# Patient Record
Sex: FEMALE | Race: WHITE | NOT HISPANIC OR LATINO | Employment: OTHER | URBAN - METROPOLITAN AREA
[De-identification: names, ages, dates, MRNs, and addresses within clinical notes are randomized per-mention and may not be internally consistent; named-entity substitution may affect disease eponyms.]

---

## 2017-02-23 DIAGNOSIS — Z30.41 ENCOUNTER FOR SURVEILLANCE OF CONTRACEPTIVE PILLS: ICD-10-CM

## 2017-02-23 NOTE — TELEPHONE ENCOUNTER
Norethin-Eth Estradiol-Fe 0.8-25 MG-MCG CHEW      Last Written Prescription Date: 12/16/16  Last Fill Quantity: 84,  # refills: 0   Last Office Visit with G, P or [facility] prescribing provider: 10/21/16        [facility]

## 2017-02-27 RX ORDER — NORETHINDRONE AND ETHINYL ESTRADIOL AND FERROUS FUMARATE 0.8-25(24)
KIT ORAL
Qty: 84 TABLET | Refills: 1 | Status: SHIPPED | OUTPATIENT
Start: 2017-02-27 | End: 2017-07-24

## 2017-02-27 NOTE — TELEPHONE ENCOUNTER
Prescription(s) approved per [facility] Refill Protocol.    Ryland Brown RN

## 2017-03-07 DIAGNOSIS — Z30.41 ENCOUNTER FOR SURVEILLANCE OF CONTRACEPTIVE PILLS: ICD-10-CM

## 2017-03-08 RX ORDER — NORETHINDRONE AND ETHINYL ESTRADIOL AND FERROUS FUMARATE 0.8-25(24)
KIT ORAL
Qty: 84 TABLET | Refills: 0 | OUTPATIENT
Start: 2017-03-08

## 2017-03-08 NOTE — TELEPHONE ENCOUNTER
Norethin-Eth Estradiol-Fe 0.8-25 MG-MCG CHEW 84 tablet 1 2/27/2017  No   Sig: TAKE 1 TABLET BY MOUTH DAILY   Class: E-Prescribe   Order: [id number]   E-Prescribing Status: Receipt confirmed by pharmacy (2/27/2017 12:55 PM CST)     Request denied.  Too soon to fill.    Maeve Qureshi RN

## 2017-07-24 DIAGNOSIS — Z30.41 ENCOUNTER FOR SURVEILLANCE OF CONTRACEPTIVE PILLS: ICD-10-CM

## 2017-07-24 NOTE — TELEPHONE ENCOUNTER
Norethin-Eth Estradiol-Fe 0.8-25 MG-MCG CHEW      Last Written Prescription Date: 02/27/17  Last Fill Quantity: 84,  # refills: 1   Last Office Visit with [facility], JOSE or [facility] prescribing provider: 10/21/16        [facility]

## 2017-07-26 PROBLEM — Z30.41 ENCOUNTER FOR SURVEILLANCE OF CONTRACEPTIVE PILLS: Status: ACTIVE | Noted: 2017-07-26

## 2017-07-26 RX ORDER — NORETHINDRONE AND ETHINYL ESTRADIOL AND FERROUS FUMARATE 0.8-25(24)
KIT ORAL
Qty: 84 TABLET | Refills: 1 | Status: SHIPPED | OUTPATIENT
Start: 2017-07-26 | End: 2017-12-18

## 2017-07-26 NOTE — TELEPHONE ENCOUNTER
Routing refill request to provider for review/approval because:  Drug not on the [facility] refill protocol   Aurelia Castro RN

## 2017-12-18 DIAGNOSIS — Z30.41 ENCOUNTER FOR SURVEILLANCE OF CONTRACEPTIVE PILLS: ICD-10-CM

## 2017-12-18 NOTE — TELEPHONE ENCOUNTER
Requested Prescriptions   Pending Prescriptions Disp Refills     KAITLIB FE 0.8-25 MG-MCG CHEW [Pharmacy Med Name: KAITLIB FE CHEWABLE TABLET]  Last Written Prescription Date:  07/26/17  Last Fill Quantity: 84,  # refills: 1   Last Office Visit with [facility], [facility] or [facility] prescribing provider:  10/21/16   Future Office Visit:    84 tablet 1     Sig: TAKE 1 TABLET BY MOUTH DAILY    Contraceptives Protocol Failed    12/18/2017  1:14 AM       Failed - Recent or future visit with authorizing provider's specialty    Patient had office visit in the last year or has a visit in the next 30 days with authorizing provider.  See chart review.              Passed - Patient is not a current smoker if age is 35 or older       Passed - No active pregnancy on record       Passed - No positive pregnancy test in past 12 months

## 2017-12-21 ENCOUNTER — OFFICE VISIT (OUTPATIENT)
Dept: FAMILY MEDICINE | Facility: CLINIC | Age: 32
End: 2017-12-21
Payer: COMMERCIAL

## 2017-12-21 VITALS
OXYGEN SATURATION: 99 % | HEART RATE: 89 BPM | SYSTOLIC BLOOD PRESSURE: 132 MMHG | HEIGHT: 61 IN | WEIGHT: 135 LBS | DIASTOLIC BLOOD PRESSURE: 82 MMHG | BODY MASS INDEX: 25.49 KG/M2 | TEMPERATURE: 98.1 F

## 2017-12-21 DIAGNOSIS — Z23 NEED FOR PROPHYLACTIC VACCINATION AND INOCULATION AGAINST INFLUENZA: ICD-10-CM

## 2017-12-21 DIAGNOSIS — A08.4 VIRAL GASTROENTERITIS: Primary | ICD-10-CM

## 2017-12-21 DIAGNOSIS — Z23 NEED FOR INFLUENZA VACCINATION: ICD-10-CM

## 2017-12-21 PROCEDURE — 99213 OFFICE O/P EST LOW 20 MIN: CPT | Mod: 25 | Performed by: NURSE PRACTITIONER

## 2017-12-21 PROCEDURE — 90471 IMMUNIZATION ADMIN: CPT | Performed by: NURSE PRACTITIONER

## 2017-12-21 PROCEDURE — 90686 IIV4 VACC NO PRSV 0.5 ML IM: CPT | Performed by: NURSE PRACTITIONER

## 2017-12-21 RX ORDER — NORETHINDRONE AND ETHINYL ESTRADIOL 0.8-25(24)
KIT ORAL
Qty: 84 TABLET | Refills: 3 | Status: SHIPPED | OUTPATIENT
Start: 2017-12-21 | End: 2018-11-27

## 2017-12-21 NOTE — PROGRESS NOTES
SUBJECTIVE:   Gudelia Albarran is a 32 year old female who presents to clinic today for the following health issues:        Diarrhea      Duration: couple of month on and off    Description:       Consistency of stool: loose       Blood in stool: no        Number of loose stools past 24 hours: 3-4 day before yesterday    Intensity:  mild, moderate    Accompanying signs and symptoms:       Fever: YES- ?       Nausea/vomitting: YES, vomited about 4 times over a couple of days but no vomiting for the last 5 days.       Abdominal pain: YES       Weight loss: YES    History (recent antibiotics or travel/ill contacts/med changes/testing done): No [place] for a week in early December    Precipitating or alleviating factors: eating mad eit worse    Therapies tried and outcome: Imodium AD and PeptoBismol    Patient reports she is allergic to artificial sweeteners but had some over her trip and got diarrhea.  Upon return, she had a cold and had persisitent diarrhea and nausea and vomiting about a week ago which has since resolved.  She is feeling much improved today, thought about cancelling her appt but wanted to be sure she didn't need an antibiotic before the Holiday.  Stools are nor brown, loose but not runny, no BRBPR, no history of hemorrhoids.  She does not think she is pregnant, LAST MENSTUAL PERIOD 12/10/17 and she is compliant with her OCP's. No other ill family members.    Problem list and histories reviewed & adjusted, as indicated.  Additional history: as documented    Patient Active Problem List   Diagnosis     Encounter for surveillance of contraceptive pills     No past surgical history on file.    Social History   Substance Use Topics     Smoking status: Never Smoker     Smokeless tobacco: Not on file     Alcohol use Yes     Family History   Problem Relation Age of Onset     Breast Cancer Maternal Grandmother      Hypertension Paternal Grandfather      Breast Cancer Maternal Aunt      DIABETES No family hx  "of      Coronary Artery Disease No family hx of      Hyperlipidemia No family hx of      CEREBROVASCULAR DISEASE Paternal Grandfather      late 80's     Colon Cancer No family hx of      Depression No family hx of      Anxiety Disorder No family hx of      Asthma Sister      Thyroid Disease No family hx of      Genetic Disorder No family hx of      Seizure Disorder Sister      trauma induced         BP Readings from Last 3 Encounters:   12/21/17 132/82   10/21/16 132/88   01/06/16 128/78    Wt Readings from Last 3 Encounters:   12/21/17 135 lb (61.2 kg)   10/21/16 140 lb 6.4 oz (63.7 kg)   01/06/16 140 lb 3.2 oz (63.6 kg)                  Labs reviewed in EPIC        Reviewed and updated as needed this visit by clinical staffTobacco  Allergies       Reviewed and updated as needed this visit by Provider         ROS:  Constitutional, HEENT, cardiovascular, pulmonary, gi and gu systems are negative, except as otherwise noted.      OBJECTIVE:   /82  Pulse 89  Temp 98.1  F (36.7  C) (Tympanic)  Ht 5' 0.83\" (1.545 m)  Wt 135 lb (61.2 kg)  SpO2 99%  Breastfeeding? No  BMI 25.65 kg/m2  Body mass index is 25.65 kg/(m^2).  GENERAL: healthy, alert and no distress  EYES: Eyes grossly normal to inspection, PERRL and conjunctivae and sclerae normal  HENT: ear canals and TM's normal, nose and mouth without ulcers or lesions  NECK: no adenopathy, no asymmetry, masses, or scars and thyroid normal to palpation  RESP: lungs clear to auscultation - no rales, rhonchi or wheezes  CV: regular rate and rhythm, normal S1 S2, no S3 or S4, no murmur, click or rub, no peripheral edema and peripheral pulses strong  ABDOMEN: soft, nontender, no hepatosplenomegaly, no masses and bowel sounds normal  MS: no gross musculoskeletal defects noted, no edema  SKIN: no suspicious lesions or rashes  NEURO: Normal strength and tone, mentation intact and speech normal  PSYCH: mentation appears normal, affect normal/bright  LYMPH: normal " "ant/post cervical, supraclavicular nodes    Diagnostic Test Results:  none     ASSESSMENT/PLAN:       BP Screening:   Last 3 BP Readings:    BP Readings from Last 3 Encounters:   12/21/17 132/82   10/21/16 132/88   01/06/16 128/78       The following was recommended to the patient:  Re-screen BP within a year and recommended lifestyle modifications  BMI:   Estimated body mass index is 25.65 kg/(m^2) as calculated from the following:    Height as of this encounter: 5' 0.83\" (1.545 m).    Weight as of this encounter: 135 lb (61.2 kg).   Weight management plan: Discussed healthy diet and exercise guidelines and patient will follow up in 12 months in clinic to re-evaluate.        1. Viral gastroenteritis  SUPPORTIVE CARE FOR VOMITING/DIARRHEA REVIEWED. RECHECK IF BLOODY DIARRHEA, PROLONGED SYMPTOMS OR SX OF DEHYDRATION WHICH WERE DISCUSSED. SMALL FREQUENT AMOUNTS OF REHYDRATION FLUIDS SUCH AS PEDIALYTE TO BE GRADUALLY ADVANCED AS TOLERATED FOR VOMITING. FOR DIARRHEA ENCOURAGE REHYDRATION FLUIDS AS WELL AS DIET OF SOLIDS AS TOLERATED. Patient is doing much better today and has already begun advancing her diet.  2. Need for influenza vaccination      3. Need for prophylactic vaccination and inoculation against influenza    - FLU VAC, SPLIT VIRUS IM > 3 YO (QUADRIVALENT) [27191]  - Vaccine Administration, Initial [51456]    See Patient Instructions    TRINY Jiménez [facility]  "

## 2017-12-21 NOTE — NURSING NOTE
"Chief Complaint   Patient presents with     Gastrointestinal Problem       Initial /90 (BP Location: Left arm, Patient Position: Sitting, Cuff Size: Adult Regular)  Pulse 89  Temp 98.1  F (36.7  C) (Tympanic)  Ht 5' 0.83\" (1.545 m)  Wt 135 lb (61.2 kg)  SpO2 99%  Breastfeeding? No  BMI 25.65 kg/m2 Estimated body mass index is 25.65 kg/(m^2) as calculated from the following:    Height as of this encounter: 5' 0.83\" (1.545 m).    Weight as of this encounter: 135 lb (61.2 kg).  Medication Reconciliation: complete .  Nikki PATE        "

## 2017-12-21 NOTE — TELEPHONE ENCOUNTER
Routing refill request to provider for review/approval because:  Failed protocol    Aurelia Castro RN

## 2017-12-21 NOTE — PROGRESS NOTES

## 2017-12-21 NOTE — MR AVS SNAPSHOT
After Visit Summary   12/21/2017    Gudelia Albarrna    MRN: [id number]           Patient Information     Date Of Birth          1985        Visit Information        Provider Department      12/21/2017 1:40 PM Ashlee Oakley APRN CNP [facility]        Today's Diagnoses     Viral gastroenteritis    -  1      Care Instructions    At [facility], we strive to deliver an exceptional experience to you, every time we see you.  If you receive a survey in the mail, please send us back your thoughts. We really do value your feedback.    Based on your medical history, these are the current health maintenance/preventive care services that you are due for (some may have been done at this visit.)  Health Maintenance Due   Topic Date Due     INFLUENZA VACCINE (SYSTEM ASSIGNED)  09/01/2017         Suggested websites for health information:  Www.Preo : Up to date and easily searchable information on multiple topics.  Www.[employer].gov : medication info, interactive tutorials, watch real surgeries online  Www.familydoctor.org : good info from the Academy of Family Physicians  Www.cdc.gov : public health info, travel advisories, epidemics (H1N1)  Www.aap.org : children's health info, normal development, vaccinations  Www.health.state.[place].us : [place] dept of health, public health issues in [place], N1N1    Your care team:                            Family Medicine Internal Medicine   MD Hubert Medley MD Shantel Branch-Fleming, MD Katya Georgiev PA-C Nam Ho, MD Pediatrics   VLADISLAV Berumen, MD Ree Maldonado CNP, MD Deborah Mielke, MD Kim Thein, APRN CNP      Clinic hours: Monday - Thursday 7 am-7 pm; Fridays 7 am-5 pm.   Urgent care: Monday - Friday 11 am-9 pm; Saturday and Sunday 9 am-5 pm.  Pharmacy : Monday -Thursday 8 am-8 pm; Friday 8 am-6 pm; Saturday and Sunday 9 am-5 pm.      Clinic: [phone]   Pharmacy: [phone]      Viral Gastroenteritis (Adult)    Gastroenteritis is commonly called the stomach flu. It is most often caused by a virus that affects the stomach and intestinal tract and usually lasts from 2 to 7 days. Common viruses causing gastroenteritis include norovirus, rotavirus, and hepatitis A. Non-viral causes of gastroenteritis include bacteria, parasites, and toxins.  The danger from repeated vomiting or diarrhea is dehydration. This is the loss of too much fluid from the body. When this occurs, body fluids must be replaced. Antibiotics do not help with this illness because it is usually viral.Simple home treatment will be helpful.  Symptoms of viral gastroenteritis may include:    Watery, loose stools    Stomach pain or abdominal cramps    Fever and chills    Nausea and vomiting    Loss of bowel control    Headache  Home care  Gastroenteritis is transmitted by contact with the stool or vomit of an infected person. This can occur from person to person or from contact with a contaminated surface.  Follow these guidelines when caring for yourself at home:    If symptoms are severe, rest at home for the next 24 hours or until you are feeling better.    Wash your hands with soap and water or use alcohol-based  to prevent the spread of infection. Wash your hands after touching anyone who is sick.    Wash your hands or use alcohol-based  after using the toilet and before meals. Clean the toilet after each use.  Remember these tips when preparing food:    People with diarrhea should not prepare or serve food to others. When preparing foods, wash your hands before and after.    Wash your hands after using cutting boards, countertops, knives, or utensils that have been in contact with raw food.    Keep uncooked meats away from cooked and ready-to-eat foods.  Medicine  You may use acetaminophen or NSAID medicines like ibuprofen or naproxen to control  fever unless another medicine was given. If you have chronic liver or kidney disease, talk with your healthcare provider before using these medicines. Also talk with your provider if you've had a stomach ulcer or gastrointestinal bleeding. Don't give aspirin to anyone under 18 years of age who is ill with a fever. It may cause severe liver damage. Don't use NSAIDS is you are already taking one for another condition (like arthritis) or are on aspirin (such as for heart disease or after a stroke).  If medicine for vomiting or diarrhea are prescribed, take these only as directed. Do not take over-the-counter medicines for vomiting or diarrhea unless instructed by your healthcare provider.  Diet  Follow these guidelines for food:    Water and liquids are important so you don't get dehydrated. Drink a small amount at a time or suck on ice chips if you are vomiting.    If you eat, avoid fatty, greasy, spicy, or fried foods.    Don't eat dairy if you have diarrhea. This can make diarrhea worse.    Avoid tobacco, alcohol, and caffeine which may worsen symptoms.  During the first 24 hours (the first full day), follow the diet below:    Beverages. Sports drinks, soft drinks without caffeine, ginger ale, mineral water (plain or flavored), decaffeinated tea and coffee. If you are very dehydrated, sports drinks aren't a good choice. They have too much sugar and not enough electrolytes. In this case, commercially available products called oral rehydration solutions, are best.    Soups. Eat clear broth, consommé, and bouillon.    Desserts. Eat gelatin, popsicles, and fruit juice bars.  During the next 24 hours (the second day), you may add the following to the above:    Hot cereal, plain toast, bread, rolls, and crackers    Plain noodles, rice, mashed potatoes, chicken noodle or rice soup    Unsweetened canned fruit (avoid pineapple), bananas    Limit fat intake to less than 15 grams per day. Do this by avoiding margarine, butter,  oils, mayonnaise, sauces, gravies, fried foods, peanut butter, meat, poultry, and fish.    Limit fiber and avoid raw or cooked vegetables, fresh fruits (except bananas), and bran cereals.    Limit caffeine and chocolate. Don't use spices or seasonings other than salt.    Limit dairy products.    Avoid alcohol.  During the next 24 hours:    Gradually resume a normal diet as you feel better and your symptoms improve.    If at any time it starts getting worse again, go back to clear liquids until you feel better.  Follow-up care  Follow up with your healthcare provider, or as advised. Call your provider if you don't get better within 24 hours or if diarrhea lasts more than a week. Also follow up if you are unable to keep down liquids and get dehydrated. If a stool (diarrhea) sample was taken, call as directed for the results.  Call 911  Call 911 if any of these occur:    Trouble breathing    Chest pain    Confused    Severe drowsiness or trouble awakening    Fainting or loss of consciousness    Rapid heart rate    Seizure    Stiff neck  When to seek medical advice  Call your healthcare provider right away if any of these occur:    Abdominal pain that gets worse    Continued vomiting (unable to keep liquids down)    Frequent diarrhea (more than 5 times a day)    Blood in vomit or stool (black or red color)    Dark urine, reduced urine output, or extreme thirst    Weakness or dizziness    Drowsiness    Fever of 100.4 F (38 C) oral or higher that does not get better with fever medicine    New rash  Date Last Reviewed: 1/3/2016    7195-3678 [employer]. [address] [place] [address], [place] [address]. All rights reserved. This information is not intended as a substitute for professional medical care. Always follow your healthcare professional's instructions.                Follow-ups after your visit        Who to contact     If you have questions or need follow up information about today's clinic visit or your  "schedule please contact [facility] directly at [phone].  Normal or non-critical lab and imaging results will be communicated to you by MyChart, letter or phone within 4 business days after the clinic has received the results. If you do not hear from us within 7 days, please contact the clinic through Qubell[facility] or phone. If you have a critical or abnormal lab result, we will notify you by phone as soon as possible.  Submit refill requests through Rain or call your pharmacy and they will forward the refill request to us. Please allow 3 business days for your refill to be completed.          Additional Information About Your Visit        [facility]ubell[facility] Information     Rain gives you secure access to your electronic health record. If you see a primary care provider, you can also send messages to your care team and make appointments. If you have questions, please call your primary care clinic.  If you do not have a primary care provider, please call [phone] and they will assist you.        Care EveryWhere ID     This is your Care EveryWhere ID. This could be used by other organizations to access your [place] medical records  [id number]        Your Vitals Were     Pulse Temperature Height Pulse Oximetry Breastfeeding? BMI (Body Mass Index)    89 98.1  F (36.7  C) (Tympanic) 5' 0.83\" (1.545 m) 99% No 25.65 kg/m2       Blood Pressure from Last 3 Encounters:   12/21/17 135/90   10/21/16 132/88   01/06/16 128/78    Weight from Last 3 Encounters:   12/21/17 135 lb (61.2 kg)   10/21/16 140 lb 6.4 oz (63.7 kg)   01/06/16 140 lb 3.2 oz (63.6 kg)              Today, you had the following     No orders found for display       Primary Care Provider Office Phone # Fax #    TRINY Neumann [phone]       [facility] [address]  [facility] [address]        Equal Access to Services     VANDANA DICKENS AH: Hadii ham ku hadasho Soomaali, waaxda luqadaha, qaybta kaalmada " reyna sototariq de leónaadaniel ahFredrick Preciado [facility] [phone].    ATENCIÓN: Si crissyla tierra, tiene a purdy disposición servicios gratuitos de asistencia lingüística. Naz al [phone].    We comply with applicable federal civil rights laws and [place] laws. We do not discriminate on the basis of race, color, national origin, age, disability, sex, sexual orientation, or gender identity.            Thank you!     Thank you for choosing [facility]  for your care. Our goal is always to provide you with excellent care. Hearing back from our patients is one way we can continue to improve our services. Please take a few minutes to complete the written survey that you may receive in the mail after your visit with us. Thank you!             Your Updated Medication List - Protect others around you: Learn how to safely use, store and throw away your medicines at www.disposemymeds.org.          This list is accurate as of: 12/21/17  2:31 PM.  Always use your most recent med list.                   Brand Name Dispense Instructions for use Diagnosis    atovaquone-proguanil 250-100 MG per tablet    MALARONE    35 tablet    Take 1 tablet by mouth daily Start 2 days before travel and continue 7 days after return.    Travel advice encounter       * Norethin-Eth Estradiol-Fe 0.8-25 MG-MCG Chew     84 tablet    Take 1 tablet by mouth daily    Encounter for surveillance of contraceptive pills       * Norethin-Eth Estradiol-Fe 0.8-25 MG-MCG Chew     84 tablet    TAKE 1 TABLET BY MOUTH DAILY    Encounter for surveillance of contraceptive pills       * Notice:  This list has 2 medication(s) that are the same as other medications prescribed for you. Read the directions carefully, and ask your doctor or other care provider to review them with you.

## 2017-12-21 NOTE — PATIENT INSTRUCTIONS
At [facility], we strive to deliver an exceptional experience to you, every time we see you.  If you receive a survey in the mail, please send us back your thoughts. We really do value your feedback.    Based on your medical history, these are the current health maintenance/preventive care services that you are due for (some may have been done at this visit.)  Health Maintenance Due   Topic Date Due     INFLUENZA VACCINE (SYSTEM ASSIGNED)  09/01/2017         Suggested websites for health information:  Www.ASYM III.org : Up to date and easily searchable information on multiple topics.  Www.medlineplus.gov : medication info, interactive tutorials, watch real surgeries online  Www.familydoctor.org : good info from the Academy of Family Physicians  Www.cdc.gov : public health info, travel advisories, epidemics (H1N1)  Www.aap.org : children's health info, normal development, vaccinations  Www.health.[facility].[place].us : [place] dept of health, public health issues in [place], N1N1    Your care team:                            Family Medicine Internal Medicine   MD Hubert Medley MD Shantel Branch-Fleming, MD Katya Georgiev PA-C Nam Ho, MD Pediatrics   [place] PASYED Rubin, CNP Comfort Bolton APRN CNP   MD Ree Padgett MD Deborah Mielke, MD Kim Thein, APRN CNP      Clinic hours: Monday - Thursday 7 am-7 pm; Fridays 7 am-5 pm.   Urgent care: Monday - Friday 11 am-9 pm; Saturday and Sunday 9 am-5 pm.  Pharmacy : Monday -Thursday 8 am-8 pm; Friday 8 am-6 pm; Saturday and Sunday 9 am-5 pm.     Clinic: [phone]   Pharmacy: [phone]      Viral Gastroenteritis (Adult)    Gastroenteritis is commonly called the stomach flu. It is most often caused by a virus that affects the stomach and intestinal tract and usually lasts from 2 to 7 days. Common viruses causing gastroenteritis include norovirus, rotavirus, and hepatitis A. Non-viral causes of  gastroenteritis include bacteria, parasites, and toxins.  The danger from repeated vomiting or diarrhea is dehydration. This is the loss of too much fluid from the body. When this occurs, body fluids must be replaced. Antibiotics do not help with this illness because it is usually viral.Simple home treatment will be helpful.  Symptoms of viral gastroenteritis may include:    Watery, loose stools    Stomach pain or abdominal cramps    Fever and chills    Nausea and vomiting    Loss of bowel control    Headache  Home care  Gastroenteritis is transmitted by contact with the stool or vomit of an infected person. This can occur from person to person or from contact with a contaminated surface.  Follow these guidelines when caring for yourself at home:    If symptoms are severe, rest at home for the next 24 hours or until you are feeling better.    Wash your hands with soap and water or use alcohol-based  to prevent the spread of infection. Wash your hands after touching anyone who is sick.    Wash your hands or use alcohol-based  after using the toilet and before meals. Clean the toilet after each use.  Remember these tips when preparing food:    People with diarrhea should not prepare or serve food to others. When preparing foods, wash your hands before and after.    Wash your hands after using cutting boards, countertops, knives, or utensils that have been in contact with raw food.    Keep uncooked meats away from cooked and ready-to-eat foods.  Medicine  You may use acetaminophen or NSAID medicines like ibuprofen or naproxen to control fever unless another medicine was given. If you have chronic liver or kidney disease, talk with your healthcare provider before using these medicines. Also talk with your provider if you've had a stomach ulcer or gastrointestinal bleeding. Don't give aspirin to anyone under 18 years of age who is ill with a fever. It may cause severe liver damage. Don't use NSAIDS is  you are already taking one for another condition (like arthritis) or are on aspirin (such as for heart disease or after a stroke).  If medicine for vomiting or diarrhea are prescribed, take these only as directed. Do not take over-the-counter medicines for vomiting or diarrhea unless instructed by your healthcare provider.  Diet  Follow these guidelines for food:    Water and liquids are important so you don't get dehydrated. Drink a small amount at a time or suck on ice chips if you are vomiting.    If you eat, avoid fatty, greasy, spicy, or fried foods.    Don't eat dairy if you have diarrhea. This can make diarrhea worse.    Avoid tobacco, alcohol, and caffeine which may worsen symptoms.  During the first 24 hours (the first full day), follow the diet below:    Beverages. Sports drinks, soft drinks without caffeine, ginger ale, mineral water (plain or flavored), decaffeinated tea and coffee. If you are very dehydrated, sports drinks aren't a good choice. They have too much sugar and not enough electrolytes. In this case, commercially available products called oral rehydration solutions, are best.    Soups. Eat clear broth, consommé, and bouillon.    Desserts. Eat gelatin, popsicles, and fruit juice bars.  During the next 24 hours (the second day), you may add the following to the above:    Hot cereal, plain toast, bread, rolls, and crackers    Plain noodles, rice, mashed potatoes, chicken noodle or rice soup    Unsweetened canned fruit (avoid pineapple), bananas    Limit fat intake to less than 15 grams per day. Do this by avoiding margarine, butter, oils, mayonnaise, sauces, gravies, fried foods, peanut butter, meat, poultry, and fish.    Limit fiber and avoid raw or cooked vegetables, fresh fruits (except bananas), and bran cereals.    Limit caffeine and chocolate. Don't use spices or seasonings other than salt.    Limit dairy products.    Avoid alcohol.  During the next 24 hours:    Gradually resume a normal  diet as you feel better and your symptoms improve.    If at any time it starts getting worse again, go back to clear liquids until you feel better.  Follow-up care  Follow up with your healthcare provider, or as advised. Call your provider if you don't get better within 24 hours or if diarrhea lasts more than a week. Also follow up if you are unable to keep down liquids and get dehydrated. If a stool (diarrhea) sample was taken, call as directed for the results.  Call 911  Call 911 if any of these occur:    Trouble breathing    Chest pain    Confused    Severe drowsiness or trouble awakening    Fainting or loss of consciousness    Rapid heart rate    Seizure    Stiff neck  When to seek medical advice  Call your healthcare provider right away if any of these occur:    Abdominal pain that gets worse    Continued vomiting (unable to keep liquids down)    Frequent diarrhea (more than 5 times a day)    Blood in vomit or stool (black or red color)    Dark urine, reduced urine output, or extreme thirst    Weakness or dizziness    Drowsiness    Fever of 100.4 F (38 C) oral or higher that does not get better with fever medicine    New rash  Date Last Reviewed: 1/3/2016    8736-1589 [employer]. [address] [place] [address], [place] [address]. All rights reserved. This information is not intended as a substitute for professional medical care. Always follow your healthcare professional's instructions.

## 2018-11-27 ENCOUNTER — TELEPHONE (OUTPATIENT)
Dept: FAMILY MEDICINE | Facility: CLINIC | Age: 33
End: 2018-11-27

## 2018-11-27 DIAGNOSIS — Z30.41 ENCOUNTER FOR SURVEILLANCE OF CONTRACEPTIVE PILLS: ICD-10-CM

## 2018-11-27 NOTE — TELEPHONE ENCOUNTER
"Requested Prescriptions   Pending Prescriptions Disp Refills     KAITLIB FE 0.8-25 MG-MCG CHEW [Pharmacy Med Name: KAITLIB FE CHEWABLE TABLET]  Last Written Prescription Date:  12/21/17  Last Fill Quantity: 84,  # refills: 3   Last Office Visit with G, P or [facility] prescribing provider:  12/21/17-Thein   Future Office Visit:    84 tablet 3     Sig: TAKE 1 TABLET BY MOUTH DAILY    Contraceptives Protocol Passed    11/27/2018  2:25 AM       Passed - Patient is not a current smoker if age is 35 or older       Passed - Recent (12 mo) or future (30 days) visit within the authorizing provider's specialty    Patient had office visit in the last 12 months or has a visit in the next 30 days with authorizing provider or within the authorizing provider's specialty.  See \"Patient Info\" tab in inbasket, or \"Choose Columns\" in Meds & Orders section of the refill encounter.             Passed - No active pregnancy on record       Passed - No positive pregnancy test in past 12 months          "

## 2018-11-28 RX ORDER — NORETHINDRONE AND ETHINYL ESTRADIOL 0.8-25(24)
KIT ORAL
Qty: 28 TABLET | Refills: 0 | Status: SHIPPED | OUTPATIENT
Start: 2018-11-28 | End: 2019-01-07

## 2018-11-28 NOTE — TELEPHONE ENCOUNTER
Medication is being filled for 1 time refill only due to:  Patient needs to be seen because due for yearly office visit in December 2018.     TC to please call patient and schedule yearly physical appointment. Thank you.    Barbie Peña RN, BSN

## 2018-12-25 DIAGNOSIS — Z30.41 ENCOUNTER FOR SURVEILLANCE OF CONTRACEPTIVE PILLS: ICD-10-CM

## 2018-12-25 NOTE — TELEPHONE ENCOUNTER
"Requested Prescriptions   Pending Prescriptions Disp Refills     KAITLIB FE 0.8-25 MG-MCG CHEW [Pharmacy Med Name: KAITLIB FE CHEWABLE TABLET] 28 tablet 0     Sig: TAKE 1 TABLET BY MOUTH EVERY DAY    Contraceptives Protocol Failed - 12/25/2018  1:27 AM       Failed - Recent (12 mo) or future (30 days) visit within the authorizing provider's specialty    Patient had office visit in the last 12 months or has a visit in the next 30 days with authorizing provider or within the authorizing provider's specialty.  See \"Patient Info\" tab in inbasket, or \"Choose Columns\" in Meds & Orders section of the refill encounter.      Last Written Prescription Date:  11/28/18  Last Fill Quantity: 28,  # refills: 0   Last office visit: 12/21/2017 with prescribing provider:     Future Office Visit:               Passed - Patient is not a current smoker if age is 35 or older       Passed - No active pregnancy on record       Passed - No positive pregnancy test in past 12 months          "

## 2018-12-27 RX ORDER — NORETHINDRONE AND ETHINYL ESTRADIOL 0.8-25(24)
KIT ORAL
Qty: 28 TABLET | Refills: 0 | OUTPATIENT
Start: 2018-12-27

## 2018-12-27 NOTE — TELEPHONE ENCOUNTER
Routing refill request to provider for review/approval because:  Patient needs to be seen because it has been more than 1 year since last office visit.      Barbie Peña RN, BSN

## 2018-12-31 NOTE — TELEPHONE ENCOUNTER
Called and spoke to patient and she states that she knows that she needs an appointment and will call back to schedule.  Trinity Simental MA/  For Teams Timmy

## 2019-01-07 ENCOUNTER — OFFICE VISIT (OUTPATIENT)
Dept: FAMILY MEDICINE | Facility: CLINIC | Age: 34
End: 2019-01-07
Payer: COMMERCIAL

## 2019-01-07 VITALS
WEIGHT: 145.8 LBS | DIASTOLIC BLOOD PRESSURE: 72 MMHG | TEMPERATURE: 98.4 F | HEIGHT: 61 IN | HEART RATE: 87 BPM | OXYGEN SATURATION: 98 % | BODY MASS INDEX: 27.53 KG/M2 | SYSTOLIC BLOOD PRESSURE: 132 MMHG

## 2019-01-07 DIAGNOSIS — Z12.4 SCREENING FOR MALIGNANT NEOPLASM OF CERVIX: ICD-10-CM

## 2019-01-07 DIAGNOSIS — Z28.21 INFLUENZA VACCINATION DECLINED BY PATIENT: ICD-10-CM

## 2019-01-07 DIAGNOSIS — Z00.00 ROUTINE PHYSICAL EXAMINATION: Primary | ICD-10-CM

## 2019-01-07 DIAGNOSIS — Z30.41 ENCOUNTER FOR SURVEILLANCE OF CONTRACEPTIVE PILLS: ICD-10-CM

## 2019-01-07 DIAGNOSIS — Z11.4 SCREENING FOR HIV (HUMAN IMMUNODEFICIENCY VIRUS): ICD-10-CM

## 2019-01-07 LAB — GLUCOSE SERPL-MCNC: 107 MG/DL (ref 70–99)

## 2019-01-07 PROCEDURE — 82947 ASSAY GLUCOSE BLOOD QUANT: CPT | Performed by: NURSE PRACTITIONER

## 2019-01-07 PROCEDURE — 99395 PREV VISIT EST AGE 18-39: CPT | Performed by: NURSE PRACTITIONER

## 2019-01-07 PROCEDURE — 87624 HPV HI-RISK TYP POOLED RSLT: CPT | Performed by: NURSE PRACTITIONER

## 2019-01-07 PROCEDURE — G0145 SCR C/V CYTO,THINLAYER,RESCR: HCPCS | Performed by: NURSE PRACTITIONER

## 2019-01-07 PROCEDURE — 87389 HIV-1 AG W/HIV-1&-2 AB AG IA: CPT | Performed by: NURSE PRACTITIONER

## 2019-01-07 PROCEDURE — 36415 COLL VENOUS BLD VENIPUNCTURE: CPT | Performed by: NURSE PRACTITIONER

## 2019-01-07 RX ORDER — NORETHINDRONE AND ETHINYL ESTRADIOL AND FERROUS FUMARATE 0.8-25(24)
1 KIT ORAL DAILY
Qty: 84 TABLET | Refills: 3 | Status: SHIPPED | OUTPATIENT
Start: 2019-01-07 | End: 2019-12-11

## 2019-01-07 RX ORDER — NORETHINDRONE AND ETHINYL ESTRADIOL AND FERROUS FUMARATE 0.8-25(24)
1 KIT ORAL DAILY
Qty: 84 TABLET | Refills: 0 | Status: CANCELLED | OUTPATIENT
Start: 2019-01-07

## 2019-01-07 ASSESSMENT — MIFFLIN-ST. JEOR: SCORE: 1320.33

## 2019-01-07 NOTE — PATIENT INSTRUCTIONS
At [facility], we strive to deliver an exceptional experience to you, every time we see you.  If you receive a survey in the mail, please send us back your thoughts. We really do value your feedback.    Your care team:                            Family Medicine Internal Medicine   MD Hubert Medley MD Shantel Branch-Fleming, MD Katya Georgiev PA-C Megan Hill, APRN CNP    Joseph Jensen MD Pediatrics   [place], VLADISLAV Rubin, MD Comfort Mccollum APRN CNP   MD Ree Padgett MD Deborah Mielke, MD Jeanine Oakley, APRN [facility]      Clinic hours: Monday - Thursday 7 am-7 pm; Fridays 7 am-5 pm.   Urgent care: Monday - Friday 11 am-9 pm; Saturday and Sunday 9 am-5 pm.  Pharmacy : Monday -Thursday 8 am-8 pm; Friday 8 am-6 pm; Saturday and Sunday 9 am-5 pm.     Clinic: [phone]   Pharmacy: [phone]        Preventive Health Recommendations  Female Ages 26 - 39  Yearly exam:   See your health care provider every year in order to    Review health changes.     Discuss preventive care.      Review your medicines if you your doctor has prescribed any.    Until age 30: Get a Pap test every three years (more often if you have had an abnormal result).    After age 30: Talk to your doctor about whether you should have a Pap test every 3 years or have a Pap test with HPV screening every 5 years.   You do not need a Pap test if your uterus was removed (hysterectomy) and you have not had cancer.  You should be tested each year for STDs (sexually transmitted diseases), if you're at risk.   Talk to your provider about how often to have your cholesterol checked.  If you are at risk for diabetes, you should have a diabetes test (fasting glucose).  Shots: Get a flu shot each year. Get a tetanus shot every 10 years.   Nutrition:     Eat at least 5 servings of fruits and vegetables each day.    Eat whole-grain bread, whole-wheat pasta and brown rice  instead of white grains and rice.    Get adequate Calcium and Vitamin D.     Lifestyle    Exercise at least 150 minutes a week (30 minutes a day, 5 days of the week). This will help you control your weight and prevent disease.    Limit alcohol to one drink per day.    No smoking.     Wear sunscreen to prevent skin cancer.    See your dentist every six months for an exam and cleaning.

## 2019-01-07 NOTE — PROGRESS NOTES
SUBJECTIVE:   CC: Gudelia Albarran is an 33 year old woman who presents for preventive health visit.     Healthy Habits:    Do you get at least three servings of calcium containing foods daily (dairy, green leafy vegetables, etc.)? yes    Amount of exercise or daily activities, outside of work: 5 day(s) per week    Problems taking medications regularly No    Medication side effects: No    Have you had an eye exam in the past two years? no    Do you see a dentist twice per year? no    Do you have sleep apnea, excessive snoring or daytime drowsiness?no      Needs refill on COCP- tolerating well, no issues with compliance, no STD history, no abnormal pap history.    Today's PHQ-2 Score:   PHQ-2 ( 1999 Pf[employer]) 12/21/2017 1/6/2016   Q1: Little interest or pleasure in doing things 0 0   Q2: Feeling down, depressed or hopeless 0 0   PHQ-2 Score 0 0       Abuse: Current or Past(Physical, Sexual or Emotional)- No  Do you feel safe in your environment? Yes    Social History     Tobacco Use     Smoking status: Never Smoker     Smokeless tobacco: Never Used   Substance Use Topics     Alcohol use: Yes     If you drink alcohol do you typically have >3 drinks per day or >7 drinks per week? No                     Reviewed orders with patient.  Reviewed health maintenance and updated orders accordingly - Yes  Labs reviewed in EPIC  BP Readings from Last 3 Encounters:   01/07/19 132/72   12/21/17 132/82   10/21/16 132/88    Wt Readings from Last 3 Encounters:   01/07/19 66.1 kg (145 lb 12.8 oz)   12/21/17 61.2 kg (135 lb)   10/21/16 63.7 kg (140 lb 6.4 oz)                  Patient Active Problem List   Diagnosis     Encounter for surveillance of contraceptive pills     History reviewed. No pertinent surgical history.    Social History     Tobacco Use     Smoking status: Never Smoker     Smokeless tobacco: Never Used   Substance Use Topics     Alcohol use: Yes     Family History   Problem Relation Age of Onset     Breast Cancer  "Maternal Grandmother      Hypertension Paternal Grandfather      Cerebrovascular Disease Paternal Grandfather         late 80's     Breast Cancer Maternal Aunt      Asthma Sister      Seizure Disorder Sister         trauma induced     Diabetes No family hx of      Coronary Artery Disease No family hx of      Hyperlipidemia No family hx of      Colon Cancer No family hx of      Depression No family hx of      Anxiety Disorder No family hx of      Thyroid Disease No family hx of      Genetic Disorder No family hx of            Mammogram not appropriate for this patient based on age.    Pertinent mammograms are reviewed under the imaging tab.  History of abnormal Pap smear: NO - age 30-65 PAP every 5 years with negative HPV co-testing recommended  PAP / HPV 1/6/2016   PAP NIL     Reviewed and updated as needed this visit by clinical staff  Tobacco  Allergies  Meds  Med Hx  Surg Hx  Fam Hx  Soc Hx        Reviewed and updated as needed this visit by Provider  Tobacco  Med Hx  Surg Hx  Fam Hx        History reviewed. No pertinent past medical history.   History reviewed. No pertinent surgical history.    ROS:  CONSTITUTIONAL: NEGATIVE for fever, chills, change in weight  INTEGUMENTARU/SKIN: NEGATIVE for worrisome rashes, moles or lesions  EYES: NEGATIVE for vision changes or irritation  ENT: NEGATIVE for ear, mouth and throat problems  RESP: NEGATIVE for significant cough or SOB  BREAST: NEGATIVE for masses, tenderness or discharge  CV: NEGATIVE for chest pain, palpitations or peripheral edema  GI: NEGATIVE for nausea, abdominal pain, heartburn, or change in bowel habits  : NEGATIVE for unusual urinary or vaginal symptoms. Periods are regular.  MUSCULOSKELETAL: NEGATIVE for significant arthralgias or myalgia  NEURO: NEGATIVE for weakness, dizziness or paresthesias  PSYCHIATRIC: NEGATIVE for changes in mood or affect    OBJECTIVE:   /72   Pulse 87   Temp 98.4  F (36.9  C) (Oral)   Ht 1.56 m (5' 1.42\")  "  Wt 66.1 kg (145 lb 12.8 oz)   SpO2 98%   BMI 27.18 kg/m    EXAM:  GENERAL: healthy, alert and no distress  EYES: Eyes grossly normal to inspection, PERRL and conjunctivae and sclerae normal  HENT: ear canals and TM's normal, nose and mouth without ulcers or lesions  NECK: no adenopathy, no asymmetry, masses, or scars and thyroid normal to palpation  RESP: lungs clear to auscultation - no rales, rhonchi or wheezes  BREAST: normal without masses, tenderness or nipple discharge and no palpable axillary masses or adenopathy  CV: regular rate and rhythm, normal S1 S2, no S3 or S4, no murmur, click or rub, no peripheral edema and peripheral pulses strong  ABDOMEN: soft, nontender, no hepatosplenomegaly, no masses and bowel sounds normal   (female): normal female external genitalia, normal urethral meatus, vaginal mucosa pink, moist, well rugated, and normal cervix/adnexa/uterus without masses or discharge  MS: no gross musculoskeletal defects noted, no edema  SKIN: no suspicious lesions or rashes  NEURO: Normal strength and tone, mentation intact and speech normal  PSYCH: mentation appears normal, affect normal/bright  LYMPH: no cervical, supraclavicular, axillary, or inguinal adenopathy    Diagnostic Test Results:  No results found for this or any previous visit (from the past 24 hour(s)).    ASSESSMENT/PLAN:   1. Routine physical examination    - Glucose    2. Encounter for surveillance of contraceptive pills    - Norethin-Eth Estradiol-Fe (KAITLIB FE) 0.8-25 MG-MCG CHEW; Take 1 tablet by mouth daily  Dispense: 84 tablet; Refill: 3    3. Screening for malignant neoplasm of cervix    - Pap imaged thin layer screen with HPV - recommended age 30 - 65 years (select HPV order below)  - HPV High Risk Types DNA Cervical    4. Screening for HIV (human immunodeficiency virus)    - HIV Screening    5. Influenza vaccination declined by patient        COUNSELING:   Reviewed preventive health counseling, as reflected in  "patient instructions    BP Readings from Last 1 Encounters:   01/07/19 132/72     Estimated body mass index is 27.18 kg/m  as calculated from the following:    Height as of this encounter: 1.56 m (5' 1.42\").    Weight as of this encounter: 66.1 kg (145 lb 12.8 oz).    BP Screening:   Last 3 BP Readings:    BP Readings from Last 3 Encounters:   01/07/19 132/72   12/21/17 132/82   10/21/16 132/88       The following was recommended to the patient:  Re-screen BP within a year and recommended lifestyle modifications  Weight management plan: Discussed healthy diet and exercise guidelines     reports that  has never smoked. she has never used smokeless tobacco.      Counseling Resources:  ATP IV Guidelines  Pooled Cohorts Equation Calculator  Breast Cancer Risk Calculator  FRAX Risk Assessment  ICSI Preventive Guidelines  Dietary Guidelines for Americans, 2010  USDA's MyPlate  ASA Prophylaxis  Lung CA Screening    TRINY Jiménez [facility]  "

## 2019-01-08 LAB — HIV 1+2 AB+HIV1 P24 AG SERPL QL IA: NONREACTIVE

## 2019-01-09 ENCOUNTER — MYC MEDICAL ADVICE (OUTPATIENT)
Dept: FAMILY MEDICINE | Facility: CLINIC | Age: 34
End: 2019-01-09

## 2019-01-10 LAB
COPATH REPORT: NORMAL
PAP: NORMAL

## 2019-01-14 LAB
FINAL DIAGNOSIS: NORMAL
HPV HR 12 DNA CVX QL NAA+PROBE: NEGATIVE
HPV16 DNA SPEC QL NAA+PROBE: NEGATIVE
HPV18 DNA SPEC QL NAA+PROBE: NEGATIVE
SPECIMEN DESCRIPTION: NORMAL
SPECIMEN SOURCE CVX/VAG CYTO: NORMAL

## 2019-01-16 NOTE — TELEPHONE ENCOUNTER
I'm not worried if the sugar was not done fasting.  It's still a good idea to work on weight loss, regular exercise and consuming a heart healthy diet, though.  Ashlee AGOSTO, CNP

## 2019-12-11 ENCOUNTER — OFFICE VISIT (OUTPATIENT)
Dept: FAMILY MEDICINE | Facility: CLINIC | Age: 34
End: 2019-12-11
Payer: COMMERCIAL

## 2019-12-11 VITALS
DIASTOLIC BLOOD PRESSURE: 85 MMHG | RESPIRATION RATE: 18 BRPM | HEART RATE: 78 BPM | WEIGHT: 145 LBS | BODY MASS INDEX: 27.38 KG/M2 | OXYGEN SATURATION: 95 % | TEMPERATURE: 98.2 F | HEIGHT: 61 IN | SYSTOLIC BLOOD PRESSURE: 132 MMHG

## 2019-12-11 DIAGNOSIS — Z30.41 ENCOUNTER FOR SURVEILLANCE OF CONTRACEPTIVE PILLS: Primary | ICD-10-CM

## 2019-12-11 DIAGNOSIS — Z23 NEED FOR IMMUNIZATION AGAINST INFLUENZA: ICD-10-CM

## 2019-12-11 PROCEDURE — 90686 IIV4 VACC NO PRSV 0.5 ML IM: CPT | Performed by: NURSE PRACTITIONER

## 2019-12-11 PROCEDURE — 99213 OFFICE O/P EST LOW 20 MIN: CPT | Mod: 25 | Performed by: NURSE PRACTITIONER

## 2019-12-11 PROCEDURE — 90471 IMMUNIZATION ADMIN: CPT | Performed by: NURSE PRACTITIONER

## 2019-12-11 RX ORDER — NORETHINDRONE AND ETHINYL ESTRADIOL AND FERROUS FUMARATE 0.8-25(24)
1 KIT ORAL DAILY
Qty: 84 TABLET | Refills: 0 | Status: SHIPPED | OUTPATIENT
Start: 2019-12-11 | End: 2020-02-12

## 2019-12-11 ASSESSMENT — MIFFLIN-ST. JEOR: SCORE: 1311.45

## 2019-12-11 ASSESSMENT — PAIN SCALES - GENERAL: PAINLEVEL: NO PAIN (0)

## 2019-12-11 NOTE — PROGRESS NOTES
SUBJECTIVE:   CC: Gudelia Albarran is an 34 year old woman who presents for preventive health visit.       Questions regarding use of birth control pill during upcoming trip to [facility]. She does not want to have her menses while travelling to [facility] and hiking [place] the mountains.  She wonders if she can skip her active pills this next month and then resume her usual regime.        Today's PHQ-2 Score:   PHQ-2 ( 1999 Pfizer) 12/11/2019 1/7/2019   Q1: Little interest or pleasure in doing things 0 0   Q2: Feeling down, depressed or hopeless 0 0   PHQ-2 Score 0 0       Abuse: Current or Past(Physical, Sexual or Emotional)- No  Do you feel safe in your environment? Yes        Social History     Tobacco Use     Smoking status: Never Smoker     Smokeless tobacco: Never Used   Substance Use Topics     Alcohol use: Yes     If you drink alcohol do you typically have >3 drinks per day or >7 drinks per week? No                     Reviewed orders with patient.  Reviewed health maintenance and updated orders accordingly - Yes  BP Readings from Last 3 Encounters:   12/11/19 132/85   01/07/19 132/72   12/21/17 132/82    Wt Readings from Last 3 Encounters:   12/11/19 65.8 kg (145 lb)   01/07/19 66.1 kg (145 lb 12.8 oz)   12/21/17 61.2 kg (135 lb)                  Patient Active Problem List   Diagnosis     Encounter for surveillance of contraceptive pills     No past surgical history on file.    Social History     Tobacco Use     Smoking status: Never Smoker     Smokeless tobacco: Never Used   Substance Use Topics     Alcohol use: Yes     Family History   Problem Relation Age of Onset     Breast Cancer Maternal Grandmother      Hypertension Paternal Grandfather      Cerebrovascular Disease Paternal Grandfather         late 80's     Breast Cancer Maternal Aunt         late 50's     Asthma Sister      Seizure Disorder Sister         trauma induced     Diabetes No family hx of      Coronary Artery Disease No family hx of       "Hyperlipidemia No family hx of      Colon Cancer No family hx of      Depression No family hx of      Anxiety Disorder No family hx of      Thyroid Disease No family hx of      Genetic Disorder No family hx of          Current Outpatient Medications   Medication Sig Dispense Refill     Norethin-Eth Estradiol-Fe (KAITLIB FE) 0.8-25 MG-MCG CHEW Take 1 tablet by mouth daily 84 tablet 0       Mammogram not appropriate for this patient based on age.    Pertinent mammograms are reviewed under the imaging tab.  History of abnormal Pap smear: NO - age 30-65 PAP every 5 years with negative HPV co-testing recommended  PAP / HPV Latest Ref Rng & Units 1/7/2019 1/6/2016   PAP - NIL NIL   HPV 16 DNA NEG:Negative Negative -   HPV 18 DNA NEG:Negative Negative -   OTHER HR HPV NEG:Negative Negative -     Reviewed and updated as needed this visit by clinical staff  Tobacco  Allergies  Meds         Reviewed and updated as needed this visit by Provider        No past medical history on file.   No past surgical history on file.    ROS:  CONSTITUTIONAL: NEGATIVE for fever, chills, change in weight  INTEGUMENTARU/SKIN: NEGATIVE for worrisome rashes, moles or lesions  EYES: NEGATIVE for vision changes or irritation  ENT: NEGATIVE for ear, mouth and throat problems  RESP: NEGATIVE for significant cough or SOB  BREAST: NEGATIVE for masses, tenderness or discharge  CV: NEGATIVE for chest pain, palpitations or peripheral edema  GI: NEGATIVE for nausea, abdominal pain, heartburn, or change in bowel habits  : NEGATIVE for unusual urinary or vaginal symptoms. Periods are regular.  MUSCULOSKELETAL: NEGATIVE for significant arthralgias or myalgia  NEURO: NEGATIVE for weakness, dizziness or paresthesias  PSYCHIATRIC: NEGATIVE for changes in mood or affect    OBJECTIVE:   /85 (BP Location: Right arm, Patient Position: Chair, Cuff Size: Adult Regular)   Pulse 78   Temp 98.2  F (36.8  C) (Oral)   Resp 18   Ht 1.56 m (5' 1.4\")   Wt 65.8 " "kg (145 lb)   LMP 12/05/2019 (Exact Date)   SpO2 95%   BMI 27.04 kg/m    EXAM:  GENERAL: healthy, alert and no distress  EYES: Eyes grossly normal to inspection, PERRL and conjunctivae and sclerae normal  HENT: ear canals and TM's normal, nose and mouth without ulcers or lesions  NECK: no adenopathy, no asymmetry, masses, or scars and thyroid normal to palpation  RESP: lungs clear to auscultation - no rales, rhonchi or wheezes  CV: regular rate and rhythm, normal S1 S2, no S3 or S4, no murmur, click or rub, no peripheral edema and peripheral pulses strong  ABDOMEN: soft, nontender, no hepatosplenomegaly, no masses and bowel sounds normal  MS: no gross musculoskeletal defects noted, no edema  SKIN: no suspicious lesions or rashes  NEURO: Normal strength and tone, mentation intact and speech normal  BACK: no CVA tenderness, no paralumbar tenderness  PSYCH: mentation appears normal, affect normal/bright    Diagnostic Test Results:  Labs reviewed in Epic  none     ASSESSMENT/PLAN:   1. Encounter for surveillance of contraceptive pills  Ok to skip inactive pills with this pill pack, then resume regular schedule (3 weeks acttve pills followed by 1 week of inactive pills). Refilled OCP X 90 days, follow back after her trip for PE at which time we'll refill her birth control.  - Norethin-Eth Estradiol-Fe (KAITLIB FE) 0.8-25 MG-MCG CHEW; Take 1 tablet by mouth daily  Dispense: 84 tablet; Refill: 0    2. Need for immunization against influenza    - VACCINE ADMINISTRATION, INITIAL  - INFLUENZA VACCINE IM > 6 MONTHS VALENT IIV4 [35950]    COUNSELING:     Estimated body mass index is 27.04 kg/m  as calculated from the following:    Height as of this encounter: 1.56 m (5' 1.4\").    Weight as of this encounter: 65.8 kg (145 lb).    Weight management plan: Discussed healthy diet and exercise guidelines     reports that she has never smoked. She has never used smokeless tobacco.      Counseling Resources:  ATP IV " Guidelines  Pooled Cohorts Equation Calculator  Breast Cancer Risk Calculator  FRAX Risk Assessment  ICSI Preventive Guidelines  Dietary Guidelines for Americans, 2010  USDA's MyPlate  ASA Prophylaxis  Lung CA Screening    TRINY Jiménez CNP  [facility]

## 2019-12-11 NOTE — PATIENT INSTRUCTIONS
At [facility], we strive to deliver an exceptional experience to you, every time we see you.  If you receive a survey in the mail, please send us back your thoughts. We really do value your feedback.    Based on your medical history, these are the current health maintenance/preventive care services that you are due for (some may have been done at this visit.)  Health Maintenance Due   Topic Date Due     INFLUENZA VACCINE (1) 09/01/2019     PREVENTIVE CARE VISIT  01/07/2020         Suggested websites for health information:  Www."[employer].org : Up to date and easily searchable information on multiple topics.  Www.[employer].gov : medication info, interactive tutorials, watch real surgeries online  Www.familydoctor.org : good info from the Academy of Family Physicians  Www.cdc.gov : public health info, travel advisories, epidemics (H1N1)  Www.aap.org : children's health info, normal development, vaccinations  Www.health.[facility].[place].us : [place] dept of health, public health issues in [place], N1N1    Your care team:                            Family Medicine Internal Medicine   MD Hubert Medley MD Shantel Branch-Fleming, MD Katya Georgiev PA-C Nam Ho, MD Pediatrics   [place] PASYED Rubin, CNP Comfort AGOSTO CNP   MD Ree Padgett MD Deborah Mielke, MD Kim Thein, APRN CNP      Clinic hours: Monday - Thursday 7 am-7 pm; Fridays 7 am-5 pm.   Urgent care: Monday - Friday 11 am-9 pm; Saturday and Sunday 9 am-5 pm.  Pharmacy : Monday -Thursday 8 am-8 pm; Friday 8 am-6 pm; Saturday and Sunday 9 am-5 pm.     Clinic: [phone]   Pharmacy: [phone]      Preventive Health Recommendations  Female Ages 26 - 39  Yearly exam:   See your health care provider every year in order to    Review health changes.     Discuss preventive care.      Review your medicines if you your doctor has prescribed any.    Until age 30: Get a Pap test every three years  (more often if you have had an abnormal result).    After age 30: Talk to your doctor about whether you should have a Pap test every 3 years or have a Pap test with HPV screening every 5 years.   You do not need a Pap test if your uterus was removed (hysterectomy) and you have not had cancer.  You should be tested each year for STDs (sexually transmitted diseases), if you're at risk.   Talk to your provider about how often to have your cholesterol checked.  If you are at risk for diabetes, you should have a diabetes test (fasting glucose).  Shots: Get a flu shot each year. Get a tetanus shot every 10 years.   Nutrition:     Eat at least 5 servings of fruits and vegetables each day.    Eat whole-grain bread, whole-wheat pasta and brown rice instead of white grains and rice.    Get adequate Calcium and Vitamin D.     Lifestyle    Exercise at least 150 minutes a week (30 minutes a day, 5 days of the week). This will help you control your weight and prevent disease.    Limit alcohol to one drink per day.    No smoking.     Wear sunscreen to prevent skin cancer.    See your dentist every six months for an exam and cleaning.

## 2020-02-11 ASSESSMENT — ENCOUNTER SYMPTOMS
ARTHRALGIAS: 0
HEARTBURN: 0
FREQUENCY: 0
WEAKNESS: 0
CONSTIPATION: 0
NERVOUS/ANXIOUS: 0
ABDOMINAL PAIN: 0
DIARRHEA: 0
SHORTNESS OF BREATH: 0
PALPITATIONS: 0
SORE THROAT: 0
CHILLS: 0
HEMATURIA: 0
MYALGIAS: 0
PARESTHESIAS: 0
EYE PAIN: 0
DIZZINESS: 0
BREAST MASS: 0
HEMATOCHEZIA: 0
JOINT SWELLING: 0
NAUSEA: 0
HEADACHES: 0
FEVER: 0
COUGH: 0
DYSURIA: 0

## 2020-02-12 ENCOUNTER — OFFICE VISIT (OUTPATIENT)
Dept: FAMILY MEDICINE | Facility: CLINIC | Age: 35
End: 2020-02-12
Payer: COMMERCIAL

## 2020-02-12 VITALS
SYSTOLIC BLOOD PRESSURE: 131 MMHG | TEMPERATURE: 98.4 F | WEIGHT: 142.8 LBS | BODY MASS INDEX: 26.96 KG/M2 | HEIGHT: 61 IN | HEART RATE: 76 BPM | RESPIRATION RATE: 20 BRPM | DIASTOLIC BLOOD PRESSURE: 86 MMHG | OXYGEN SATURATION: 97 %

## 2020-02-12 DIAGNOSIS — Z87.39 HISTORY OF RIGHT TENNIS ELBOW: ICD-10-CM

## 2020-02-12 DIAGNOSIS — Z13.6 CARDIOVASCULAR SCREENING; LDL GOAL LESS THAN 160: ICD-10-CM

## 2020-02-12 DIAGNOSIS — Z00.00 ROUTINE GENERAL MEDICAL EXAMINATION AT A HEALTH CARE FACILITY: Primary | ICD-10-CM

## 2020-02-12 DIAGNOSIS — Z30.41 ENCOUNTER FOR SURVEILLANCE OF CONTRACEPTIVE PILLS: ICD-10-CM

## 2020-02-12 LAB
CHOLEST SERPL-MCNC: 144 MG/DL
GLUCOSE SERPL-MCNC: 100 MG/DL (ref 70–99)
HDLC SERPL-MCNC: 56 MG/DL
LDLC SERPL CALC-MCNC: 68 MG/DL
NONHDLC SERPL-MCNC: 88 MG/DL
TRIGL SERPL-MCNC: 100 MG/DL

## 2020-02-12 PROCEDURE — 99213 OFFICE O/P EST LOW 20 MIN: CPT | Mod: 25 | Performed by: NURSE PRACTITIONER

## 2020-02-12 PROCEDURE — 80061 LIPID PANEL: CPT | Performed by: NURSE PRACTITIONER

## 2020-02-12 PROCEDURE — 82947 ASSAY GLUCOSE BLOOD QUANT: CPT | Performed by: NURSE PRACTITIONER

## 2020-02-12 PROCEDURE — 36415 COLL VENOUS BLD VENIPUNCTURE: CPT | Performed by: NURSE PRACTITIONER

## 2020-02-12 PROCEDURE — 99395 PREV VISIT EST AGE 18-39: CPT | Performed by: NURSE PRACTITIONER

## 2020-02-12 RX ORDER — NORETHINDRONE AND ETHINYL ESTRADIOL AND FERROUS FUMARATE 0.8-25(24)
1 KIT ORAL DAILY
Qty: 84 TABLET | Refills: 3 | Status: SHIPPED | OUTPATIENT
Start: 2020-02-12 | End: 2021-02-09

## 2020-02-12 ASSESSMENT — ENCOUNTER SYMPTOMS
EYE PAIN: 0
ABDOMINAL PAIN: 0
WEAKNESS: 0
HEMATOCHEZIA: 0
NERVOUS/ANXIOUS: 0
SHORTNESS OF BREATH: 0
DYSURIA: 0
COUGH: 0
DIZZINESS: 0
PARESTHESIAS: 0
MYALGIAS: 0
FEVER: 0
DIARRHEA: 0
BREAST MASS: 0
CONSTIPATION: 0
SORE THROAT: 0
ARTHRALGIAS: 0
HEADACHES: 0
FREQUENCY: 0
JOINT SWELLING: 0
NAUSEA: 0
HEMATURIA: 0
PALPITATIONS: 0
CHILLS: 0
HEARTBURN: 0

## 2020-02-12 ASSESSMENT — MIFFLIN-ST. JEOR: SCORE: 1295.12

## 2020-02-12 ASSESSMENT — PAIN SCALES - GENERAL: PAINLEVEL: NO PAIN (0)

## 2020-02-12 NOTE — PROGRESS NOTES
SUBJECTIVE:   CC: Gudelia Albarran is an 34 year old woman who presents for preventive health visit.     Healthy Habits:     Getting at least 3 servings of Calcium per day:  NO    Bi-annual eye exam:  Yes    Dental care twice a year:  NO    Sleep apnea or symptoms of sleep apnea:  None    Diet:  Regular (no restrictions)    Frequency of exercise:  2-3 days/week    Duration of exercise:  30-45 minutes    Taking medications regularly:  Yes    Medication side effects:  None    PHQ-2 Total Score: 0    Additional concerns today:  No      Today's PHQ-2 Score:   PHQ-2 ( 1999 Pfizer) 2/11/2020   Q1: Little interest or pleasure in doing things 0   Q2: Feeling down, depressed or hopeless 0   PHQ-2 Score 0   Q1: Little interest or pleasure in doing things Not at all   Q2: Feeling down, depressed or hopeless Not at all   PHQ-2 Score 0       Abuse: Current or Past(Physical, Sexual or Emotional)- No  Do you feel safe in your environment? 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  She admits to intermittent numbness/tiongling in her 4th and 5th fingers after prolonged desk work. She was kicked in the elbow by a horse as a child and complains of intermittent numbness/toingling in her forearm, 4th and 5th fingers when leaning on a desk- requests physical therapy referral. Advil helps.    Social History     Tobacco Use     Smoking status: Never Smoker     Smokeless tobacco: Never Used   Substance Use Topics     Alcohol use: Yes         Alcohol Use 2/11/2020   Prescreen: >3 drinks/day or >7 drinks/week? No   Prescreen: >3 drinks/day or >7 drinks/week? -       Reviewed orders with patient.  Reviewed health maintenance and updated orders accordingly - Yes  Labs reviewed in EPIC  BP Readings from Last 3 Encounters:   02/12/20 131/86   12/11/19 132/85   01/07/19 132/72    Wt Readings from Last 3 Encounters:   02/12/20 64.8 kg (142 lb 12.8 oz)   12/11/19 65.8  kg (145 lb)   01/07/19 66.1 kg (145 lb 12.8 oz)                  Patient Active Problem List   Diagnosis     Encounter for surveillance of contraceptive pills     History reviewed. No pertinent surgical history.    Social History     Tobacco Use     Smoking status: Never Smoker     Smokeless tobacco: Never Used   Substance Use Topics     Alcohol use: Yes     Family History   Problem Relation Age of Onset     Breast Cancer Maternal Grandmother      Hypertension Paternal Grandfather      Cerebrovascular Disease Paternal Grandfather         late 80's     Breast Cancer Maternal Aunt         late 50's     Asthma Sister      Seizure Disorder Sister         trauma induced     Diabetes No family hx of      Coronary Artery Disease No family hx of      Hyperlipidemia No family hx of      Colon Cancer No family hx of      Depression No family hx of      Anxiety Disorder No family hx of      Thyroid Disease No family hx of      Genetic Disorder No family hx of            Mammogram not appropriate for this patient based on age.    Pertinent mammograms are reviewed under the imaging tab.  History of abnormal Pap smear: NO - age 30-65 PAP every 5 years with negative HPV co-testing recommended  PAP / HPV Latest Ref Rng & Units 1/7/2019 1/6/2016   PAP - NIL NIL   HPV 16 DNA NEG:Negative Negative -   HPV 18 DNA NEG:Negative Negative -   OTHER HR HPV NEG:Negative Negative -     Reviewed and updated as needed this visit by clinical staff         Reviewed and updated as needed this visit by Provider        History reviewed. No pertinent past medical history.   History reviewed. No pertinent surgical history.    Review of Systems   Constitutional: Negative for chills and fever.   HENT: Negative for congestion, ear pain, hearing loss and sore throat.    Eyes: Negative for pain and visual disturbance.   Respiratory: Negative for cough and shortness of breath.    Cardiovascular: Negative for chest pain, palpitations and peripheral edema.    Gastrointestinal: Negative for abdominal pain, constipation, diarrhea, heartburn, hematochezia and nausea.   Breasts:  Negative for tenderness, breast mass and discharge.   Genitourinary: Negative for dysuria, frequency, genital sores, hematuria, pelvic pain, urgency, vaginal bleeding and vaginal discharge.   Musculoskeletal: Negative for arthralgias, joint swelling and myalgias.   Skin: Negative for rash.   Neurological: Negative for dizziness, weakness, headaches and paresthesias.   Psychiatric/Behavioral: Negative for mood changes. The patient is not nervous/anxious.      CONSTITUTIONAL: NEGATIVE for fever, chills, change in weight  INTEGUMENTARU/SKIN: NEGATIVE for worrisome rashes, moles or lesions  EYES: NEGATIVE for vision changes or irritation  ENT: NEGATIVE for ear, mouth and throat problems  RESP: NEGATIVE for significant cough or SOB  BREAST: NEGATIVE for masses, tenderness or discharge  CV: NEGATIVE for chest pain, palpitations or peripheral edema  GI: NEGATIVE for nausea, abdominal pain, heartburn, or change in bowel habits  : NEGATIVE for unusual urinary or vaginal symptoms. Periods are regular.  MUSCULOSKELETAL: NEGATIVE for significant arthralgias or myalgia  NEURO: NEGATIVE for weakness, dizziness or paresthesias  ENDOCRINE: NEGATIVE for temperature intolerance, skin/hair changes  PSYCHIATRIC: NEGATIVE for changes in mood or affect     OBJECTIVE:   There were no vitals taken for this visit.  Physical Exam  GENERAL: healthy, alert and no distress  EYES: Eyes grossly normal to inspection, PERRL and conjunctivae and sclerae normal  HENT: ear canals and TM's normal, nose and mouth without ulcers or lesions  NECK: no adenopathy, no asymmetry, masses, or scars and thyroid normal to palpation  RESP: lungs clear to auscultation - no rales, rhonchi or wheezes  BREAST: normal without masses, tenderness or nipple discharge and no palpable axillary masses or adenopathy  CV: regular rate and rhythm, normal S1  S2, no S3 or S4, no murmur, click or rub, no peripheral edema and peripheral pulses strong  ABDOMEN: soft, nontender, no hepatosplenomegaly, no masses and bowel sounds normal   (female): deferred  MS: right elbow- FAROM, no point tenderness presently but reports she ahs pain at medial epicondyle when typing, normal strength, sensation distally, cap refill < 3 sec. Otherwise, no gross musculoskeletal defects noted, no edema  SKIN: no suspicious lesions or rashes  NEURO: Normal strength and tone, mentation intact and speech normal  PSYCH: mentation appears normal, affect normal/bright    Diagnostic Test Results:  Labs reviewed in Epic  Results for orders placed or performed in visit on 02/12/20 (from the past 24 hour(s))   Glucose   Result Value Ref Range    Glucose 100 (H) 70 - 99 mg/dL   Lipid Profile (Chol, Trig, HDL, LDL calc)   Result Value Ref Range    Cholesterol 144 <200 mg/dL    Triglycerides 100 <150 mg/dL    HDL Cholesterol 56 >49 mg/dL    LDL Cholesterol Calculated 68 <100 mg/dL    Non HDL Cholesterol 88 <130 mg/dL       ASSESSMENT/PLAN:   1. Routine general medical examination at a health care facility    - Glucose    2. Encounter for surveillance of contraceptive pills  Refilled OCP's, reviewed missed pill regimine, due for pap 1/2024  - Norethin-Eth Estradiol-Fe (KAITLIB FE) 0.8-25 MG-MCG CHEW; Take 1 tablet by mouth daily  Dispense: 84 tablet; Refill: 3    3. History of right tennis elbow  Referring to physical therapy, Ok to continue with Ibuprofen prn, reviewed work station set up for ergonomics, return to clinic if not improved, new, or worsening symptoms.   - [facility]; Future    4. CARDIOVASCULAR SCREENING; LDL GOAL LESS THAN 160    - Lipid Profile (Chol, Trig, HDL, LDL calc)    COUNSELING:  Reviewed preventive health counseling, as reflected in patient instructions    Estimated body mass index is 27.04 kg/m  as calculated from the following:    Height as of  "12/11/19: 1.56 m (5' 1.4\").    Weight as of 12/11/19: 65.8 kg (145 lb).    Weight management plan: Discussed healthy diet and exercise guidelines     reports that she has never smoked. She has never used smokeless tobacco.      Counseling Resources:  ATP IV Guidelines  Pooled Cohorts Equation Calculator  Breast Cancer Risk Calculator  FRAX Risk Assessment  ICSI Preventive Guidelines  Dietary Guidelines for Americans, 2010  USDA's MyPlate  ASA Prophylaxis  Lung CA Screening    TRINY Jiménez [facility]  "

## 2020-02-12 NOTE — PROGRESS NOTES
"   SUBJECTIVE:   CC: Gudelia Albarran is an 34 year old woman who presents for preventive health visit.     Healthy Habits:    Do you get at least three servings of calcium containing foods daily (dairy, green leafy vegetables, etc.)? { :208502::\"yes\"}    Amount of exercise or daily activities, outside of work: { :479794}    Problems taking medications regularly { :304961::\"No\"}    Medication side effects: { :486044::\"No\"}    Have you had an eye exam in the past two years? { :080437}    Do you see a dentist twice per year? { :847244}    Do you have sleep apnea, excessive snoring or daytime drowsiness?{ :719059}  {Outside tests to abstract? :542981}    {additional problems to add (Optional):072460}    Today's PHQ-2 Score:   PHQ-2 ( 1999 Pfizer) 2/11/2020 12/11/2019   Q1: Little interest or pleasure in doing things 0 0   Q2: Feeling down, depressed or hopeless 0 0   PHQ-2 Score 0 0   Q1: Little interest or pleasure in doing things Not at all -   Q2: Feeling down, depressed or hopeless Not at all -   PHQ-2 Score 0 -     {PHQ-2 LOOK IN ASSESSMENTS (Optional) :157520}  Abuse: Current or Past(Physical, Sexual or Emotional)- {YES/NO/NA:609328}  Do you feel safe in your environment? {YES/NO/NA:174968}        Social History     Tobacco Use     Smoking status: Never Smoker     Smokeless tobacco: Never Used   Substance Use Topics     Alcohol use: Yes     If you drink alcohol do you typically have >3 drinks per day or >7 drinks per week? {ETOH :405541}                     Reviewed orders with patient.  Reviewed health maintenance and updated orders accordingly - {Yes/No:806009::\"Yes\"}  {Chronicprobdata (Optional):014393}    {Mammo Decision Support (Optional):641794}    Pertinent mammograms are reviewed under the imaging tab.  History of abnormal Pap smear: {PAP HX:385673}  PAP / HPV Latest Ref Rng & Units 1/7/2019 1/6/2016   PAP - NIL NIL   HPV 16 DNA NEG:Negative Negative -   HPV 18 DNA NEG:Negative Negative -   OTHER HR HPV " "NEG:Negative Negative -     Reviewed and updated as needed this visit by clinical staff         Reviewed and updated as needed this visit by Provider        {HISTORY OPTIONS (Optional):156837}    ROS:  { :537589}    OBJECTIVE:   There were no vitals taken for this visit.  EXAM:  {Exam Choices:267550}    {Diagnostic Test Results (Optional):972790::\"Diagnostic Test Results:\",\"Labs reviewed in Epic\"}    ASSESSMENT/PLAN:   {Diag Picklist:334079}    COUNSELING:   {FEMALE COUNSELING MESSAGES:056149::\"Reviewed preventive health counseling, as reflected in patient instructions\"}    Estimated body mass index is 27.04 kg/m  as calculated from the following:    Height as of 12/11/19: 1.56 m (5' 1.4\").    Weight as of 12/11/19: 65.8 kg (145 lb).    {Weight Management Plan (ACO) Complete if BMI is abnormal-  Ages 18-64  BMI >24.9.  Age 65+ with BMI <23 or >30 (Optional):160418}     reports that she has never smoked. She has never used smokeless tobacco.  {Tobacco Cessation -- Complete if patient is a smoker (Optional):491267}    Counseling Resources:  ATP IV Guidelines  Pooled Cohorts Equation Calculator  Breast Cancer Risk Calculator  FRAX Risk Assessment  ICSI Preventive Guidelines  Dietary Guidelines for Americans, 2010  USDA's MyPlate  ASA Prophylaxis  Lung CA Screening    TRINY Jiménez [facility]  "

## 2020-03-10 ENCOUNTER — THERAPY VISIT (OUTPATIENT)
Dept: OCCUPATIONAL THERAPY | Facility: CLINIC | Age: 35
End: 2020-03-10
Attending: NURSE PRACTITIONER
Payer: COMMERCIAL

## 2020-03-10 DIAGNOSIS — Z87.39 HISTORY OF RIGHT TENNIS ELBOW: ICD-10-CM

## 2020-03-10 DIAGNOSIS — M25.521 RIGHT ELBOW PAIN: ICD-10-CM

## 2020-03-10 DIAGNOSIS — M77.01 MEDIAL EPICONDYLITIS OF ELBOW, RIGHT: ICD-10-CM

## 2020-03-10 DIAGNOSIS — G56.21 CUBITAL TUNNEL SYNDROME ON RIGHT: ICD-10-CM

## 2020-03-10 PROCEDURE — 97112 NEUROMUSCULAR REEDUCATION: CPT | Mod: GO | Performed by: OCCUPATIONAL THERAPIST

## 2020-03-10 PROCEDURE — 97110 THERAPEUTIC EXERCISES: CPT | Mod: GO | Performed by: OCCUPATIONAL THERAPIST

## 2020-03-10 PROCEDURE — 97165 OT EVAL LOW COMPLEX 30 MIN: CPT | Mod: GO | Performed by: OCCUPATIONAL THERAPIST

## 2020-03-10 NOTE — PROGRESS NOTES
Hand Therapy Initial Evaluation    Current Date:  3/10/2020  Referring Physician:Ashlee Oakley APRN CNP     Diagnosis: Right Cubital Tunnel and Medial Epicondylitis  DOI: 2/12/20 (Date of order)    Subjective:  Gudelia Albarran is a 35 year old right hand dominant female.    Patient reports symptoms of pain, stiffness/loss of motion, weakness/loss of strength, numbness and tingling  of the right wrist, elbow and hand which occurred due to repetitive motions. Patient reports have these symptoms off and on for about 10-15 years.  Since onset symptoms are Gradually getting worse.  Special tests:  none.  Previous treatment: PT in 2005.    General health as reported by patient is good.  Pertinent medical history includes:Concussions/Dizziness, Numbness/Tingling  Medical allergies:none.  Surgical history: other: tonsillectomy.  Medication history: contraceptive pill.    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)  Currently working in normal job without restrictions  Job Tasks: Computer Work, Driving, Prolonged Sitting  Prior functional level:  no limitations  Barriers include:none  Mobility: No difficulty  Transportation: drives   Leisure activities/hobbies: horse backing, reading, dogs, puzzle    Upper Extremity Functional Index Score:  SCORE:   Column Totals: /80: (P) 69   (A lower score indicates greater disability.)    Objective:  Pain Level (Scale 0-10):   3/10/2020   At Rest 1-2/10   With Use 3-4/10     Pain Description:  Date 3/10/2020   Location elbow, wrist and small finger   Pain Quality Aching and radiating prickly pain down forearm   Frequency intermittent     Pain is worst  daytime (end of day)   Exacerbated by  typing, repetitive arm movements   Relieved by cold, NSAIDs and rest   Progression Gradually worsening     Posture  Normal      ROM  Pain Report: - none  + mild    ++ moderate    +++ severe   Elbow 3/10/2020 3/10/2020   AROM (PROM)  Left Right   Extension +10 +10   Flexion 150 150   Supination     Pronation       Wrist 3/10/2020 3/10/2020   AROM (PROM) Left Right   Extension 80 77   Flexion 85 85   RD     UD       Special Tests   3/10/2020   ULTT Radial Nerve 4/5     Strength   (Measured in pounds)  Pain Report: - none  + mild    ++ moderate    +++ severe    3/10/2020 3/10/2020   Trials Left Right   1  2  3 58  56  46 38  42  38   Average 53 39       3/10/2020 3/10/2020    Left Right   Elbow Ext 49 39     Resisted Testing  Pain Report:  - none    + mild    ++ moderate    +++ severe   Right 3/10/2020   Elbow Extension +   Elbow Flexion -   Supination  +   Pronation +   Wrist Flex with RD, Elbow at side +   Wrist Flex with UD, Elbow at side +   Wrist Flex with RD, Elbow Ext +   Wrist Flex with UD, Elbow Ext +   FDS -     Sensation  Decreased Ulnar Nerve distribution per pt report  Numbness/Tingling Level Report  VAS(0-10) 3/10/2020   At Rest: 1/10   With Use: 3-6/10     Special Tests:  Pain Report: - none  + mild    ++ moderate    +++ severe    3/10/2020   Tinels at cubital tunnel +   Tinel's at [place] +   Elbow Flexion Test 10 sec   Ulnar nerve subluxation at elbow +   LESLIE  NT   ULTT ulnar nerve 4/5   Froment's Test +     Ulnar Nerve MMT: (Scale 0/5)   3/10/2020   Finger Adduction 4/5   Finger Abduction 4/5   Adductor Pollicis 5/5     Palpation  Pain Report:  - none    + mild    ++ moderate    +++ severe   Date 3/10/2020   Bicep Muscle -   Distal Bicep Tendon -   [place] +   Cubital Tunnel  ++   MEP -   Flexor Origin ++   Flexor Wad +   Pronator Teres +   [place] -     Assessment:  Patient presents with symptoms consistent with diagnosis of cubital tunnel and medial epicondylitis,  with conservative intervention.     Patient's limitations or Problem List includes:  Pain, Weakness, Sensory disturbance and Tightness in musculature of the right elbow, wrist and hand which interferes with the patient's ability to  perform Self Care Tasks (bathing), Work Tasks, Sleep Patterns, Recreational Activities and Household Chores as compared to previous level of function.    Rehab Potential:  Excellent - Return to full activity, no limitations    Patient will benefit from skilled Occupational Therapy to increase flexibility, overall strength and sensation and decrease pain to return to previous activity level and resume normal daily tasks and to reach their rehab potential.    Barriers to Learning:  No barrier    Communication Issues:  Patient appears to be able to clearly communicate and understand verbal and written communication and follow directions correctly.    Chart Review: Brief history including review of medical and/or therapy records relating to the presenting problem and Simple history review with patient    Identified Performance Deficits: bathing/showering, home establishment and management, meal preparation and cleanup, work and leisure activities    Assessment of Occupational Performance:  3-5 Performance Deficits  Clinical Decision Making (Complexity): Low complexity    Treatment Explanation:  The following has been discussed with the patient:  RX ordered/plan of care  Anticipated outcomes  Possible risks and side effects  Plan:  Frequency:  1 X week, once daily  Duration:  for 8 weeks  Treatment Plan:    Modalities:  US  Therapeutic Exercise:  AROM, PROM, Tendon Gliding, Isotonics and Isometrics  Neuromuscular re-education:  Nerve Gliding, Posture and Kinesiotaping  Manual Techniques:  Friction massage and Myofascial release  Orthotic Fabrication:  Forearm based orthosis  Self Care:  Self Care Tasks, Ergonomic Considerations and Diagnostic Education    Discharge Plan:  Achieve all LTG.  Independent in home treatment program.  Reach maximal therapeutic benefit.    Home Exercise Program:  Warmth for stiffness to flexors  Ice after activity for pain to MEP  Flexor and extensor wad stretches  MFR to flexors  TFM to  MEP  Ulnar nerve glides  Trial of K-tape    Next Visit:  Passive Ulnar nerve glides  Isometric wrist flexor and pronator strengthening  Progress to eccentric wrist flexor strengthening as tolerated  US ?

## 2020-04-08 ENCOUNTER — TELEPHONE (OUTPATIENT)
Dept: PHYSICAL THERAPY | Facility: CLINIC | Age: 35
End: 2020-04-08

## 2020-04-14 ENCOUNTER — TELEPHONE (OUTPATIENT)
Dept: PHYSICAL THERAPY | Facility: CLINIC | Age: 35
End: 2020-04-14

## 2020-05-08 PROBLEM — M25.521 RIGHT ELBOW PAIN: Status: RESOLVED | Noted: 2020-03-10 | Resolved: 2020-05-08

## 2020-05-08 PROBLEM — M77.01 MEDIAL EPICONDYLITIS OF ELBOW, RIGHT: Status: RESOLVED | Noted: 2020-03-10 | Resolved: 2020-05-08

## 2020-05-08 NOTE — PROGRESS NOTES
Discharge Summary - Hand Therapy    Patient did not return to therapy after the initial evaluation and due to Covaid 19.  Patient reports that hand therapy is no longer needed at this time per phone call on 4/14/20. We will assume that patient's goals were met.    D/C from [facility].

## 2020-10-30 ENCOUNTER — MYC MEDICAL ADVICE (OUTPATIENT)
Dept: FAMILY MEDICINE | Facility: CLINIC | Age: 35
End: 2020-10-30

## 2020-10-30 ENCOUNTER — E-VISIT (OUTPATIENT)
Dept: FAMILY MEDICINE | Facility: CLINIC | Age: 35
End: 2020-10-30
Payer: COMMERCIAL

## 2020-10-30 DIAGNOSIS — R03.0 ELEVATED BLOOD PRESSURE READING WITHOUT DIAGNOSIS OF HYPERTENSION: Primary | ICD-10-CM

## 2020-10-30 DIAGNOSIS — R03.0 ELEVATED BP WITHOUT DIAGNOSIS OF HYPERTENSION: ICD-10-CM

## 2020-10-30 PROCEDURE — 99421 OL DIG E/M SVC 5-10 MIN: CPT | Performed by: NURSE PRACTITIONER

## 2020-10-30 NOTE — TELEPHONE ENCOUNTER
Sent a My Chart message of Concetta's response and evisit instructions.  [facility]  2nd Floor  Primary Care

## 2020-10-30 NOTE — TELEPHONE ENCOUNTER
Hypertension not on problem list. Had annual exam on 2/12/20 with Ashlee Oakley. Blood pressure at that visit was 131/86.     Routing to provider to review and advise. Does patient need a visit for this request? Please advise what patient should do.     Fadumo Barragan RN  [facility]

## 2020-11-10 ENCOUNTER — MYC MEDICAL ADVICE (OUTPATIENT)
Dept: FAMILY MEDICINE | Facility: CLINIC | Age: 35
End: 2020-11-10

## 2020-11-13 NOTE — TELEPHONE ENCOUNTER
Printed and mailing Blood Pressure Monitor DME to patient's home address.  [facility]  2nd Floor  Primary Care

## 2020-12-05 ENCOUNTER — MYC MEDICAL ADVICE (OUTPATIENT)
Dept: FAMILY MEDICINE | Facility: CLINIC | Age: 35
End: 2020-12-05

## 2020-12-07 NOTE — TELEPHONE ENCOUNTER
See MyC messages below.   Patient was scheduled for face-to-face visit for BP check and medication discussion. Spouse recently tested positive for COVID and she was told to quarantine at home x 10 days. As of 12/5 she was reporting no symptoms. Patient asked if should still come in today for scheduled visit.  Writer advised for patient to cancel face-to-face and to schedule visit as virtual visit instead, at this time and due to exposure and recommendations to quarantine.    Candy Crawford RN  [facility]

## 2020-12-27 ENCOUNTER — HEALTH MAINTENANCE LETTER (OUTPATIENT)
Age: 35
End: 2020-12-27

## 2020-12-31 ENCOUNTER — OFFICE VISIT (OUTPATIENT)
Dept: FAMILY MEDICINE | Facility: CLINIC | Age: 35
End: 2020-12-31
Payer: COMMERCIAL

## 2020-12-31 VITALS
OXYGEN SATURATION: 98 % | HEIGHT: 61 IN | HEART RATE: 92 BPM | BODY MASS INDEX: 29.45 KG/M2 | DIASTOLIC BLOOD PRESSURE: 86 MMHG | SYSTOLIC BLOOD PRESSURE: 144 MMHG | WEIGHT: 156 LBS | TEMPERATURE: 98.3 F

## 2020-12-31 DIAGNOSIS — R03.0 ELEVATED BP WITHOUT DIAGNOSIS OF HYPERTENSION: Primary | ICD-10-CM

## 2020-12-31 DIAGNOSIS — Z11.59 NEED FOR HEPATITIS C SCREENING TEST: ICD-10-CM

## 2020-12-31 DIAGNOSIS — Z23 NEED FOR INFLUENZA VACCINATION: ICD-10-CM

## 2020-12-31 DIAGNOSIS — G43.909 MIGRAINE WITHOUT STATUS MIGRAINOSUS, NOT INTRACTABLE, UNSPECIFIED MIGRAINE TYPE: ICD-10-CM

## 2020-12-31 LAB
ANION GAP SERPL CALCULATED.3IONS-SCNC: 5 MMOL/L (ref 3–14)
BUN SERPL-MCNC: 14 MG/DL (ref 7–30)
CALCIUM SERPL-MCNC: 9.5 MG/DL (ref 8.5–10.1)
CHLORIDE SERPL-SCNC: 106 MMOL/L (ref 94–109)
CO2 SERPL-SCNC: 26 MMOL/L (ref 20–32)
CREAT SERPL-MCNC: 0.67 MG/DL (ref 0.52–1.04)
CREAT UR-MCNC: 17 MG/DL
GFR SERPL CREATININE-BSD FRML MDRD: >90 ML/MIN/{1.73_M2}
GLUCOSE SERPL-MCNC: 104 MG/DL (ref 70–99)
MICROALBUMIN UR-MCNC: 14 MG/L
MICROALBUMIN/CREAT UR: 83.33 MG/G CR (ref 0–25)
POTASSIUM SERPL-SCNC: 3.8 MMOL/L (ref 3.4–5.3)
SODIUM SERPL-SCNC: 137 MMOL/L (ref 133–144)

## 2020-12-31 PROCEDURE — 90471 IMMUNIZATION ADMIN: CPT | Performed by: NURSE PRACTITIONER

## 2020-12-31 PROCEDURE — 86803 HEPATITIS C AB TEST: CPT | Performed by: NURSE PRACTITIONER

## 2020-12-31 PROCEDURE — 36415 COLL VENOUS BLD VENIPUNCTURE: CPT | Performed by: NURSE PRACTITIONER

## 2020-12-31 PROCEDURE — 80048 BASIC METABOLIC PNL TOTAL CA: CPT | Performed by: NURSE PRACTITIONER

## 2020-12-31 PROCEDURE — 90686 IIV4 VACC NO PRSV 0.5 ML IM: CPT | Performed by: NURSE PRACTITIONER

## 2020-12-31 PROCEDURE — 99214 OFFICE O/P EST MOD 30 MIN: CPT | Mod: 25 | Performed by: NURSE PRACTITIONER

## 2020-12-31 PROCEDURE — 82043 UR ALBUMIN QUANTITATIVE: CPT | Performed by: NURSE PRACTITIONER

## 2020-12-31 ASSESSMENT — MIFFLIN-ST. JEOR: SCORE: 1356.34

## 2020-12-31 ASSESSMENT — PAIN SCALES - GENERAL: PAINLEVEL: NO PAIN (0)

## 2020-12-31 NOTE — PROGRESS NOTES
"Subjective     Gudelia Albarran is a 35 year old female who presents to clinic today for the following health issues:    HPI         Hypertension Follow-up      Do you check your blood pressure regularly outside of the clinic? Yes     Are you following a low salt diet? Yes    Are your blood pressures ever more than 140 on the top number (systolic) OR more   than 90 on the bottom number (diastolic), for example 140/90? Yes      How many servings of fruits and vegetables do you eat daily?  2-3    On average, how many sweetened beverages do you drink each day (Examples: soda, juice, sweet tea, etc.  Do NOT count diet or artificially sweetened beverages)?   0    How many days per week do you exercise enough to make your heart beat faster? 6    How many minutes a day do you exercise enough to make your heart beat faster? 30 - 60    How many days per week do you miss taking your medication? 0    She has been on COCP's to help with her migraines since age 15, is now rarely getting a migraine.       Review of Systems   Constitutional, HEENT, cardiovascular, pulmonary, gi and gu systems are negative, except as otherwise noted.      Objective    BP (!) 144/86   Pulse 92   Temp 98.3  F (36.8  C) (Oral)   Ht 1.56 m (5' 1.4\")   Wt 70.8 kg (156 lb)   LMP 12/25/2020   SpO2 98%   Breastfeeding No   BMI 29.09 kg/m    Body mass index is 29.09 kg/m .  Physical Exam   GENERAL: healthy, alert and no distress  EYES: Eyes grossly normal to inspection, PERRL and conjunctivae and sclerae normal  HENT: ear canals and TM's normal, nose and mouth without ulcers or lesions  NECK: no adenopathy, no asymmetry, masses, or scars and thyroid normal to palpation  RESP: lungs clear to auscultation - no rales, rhonchi or wheezes  CV: regular rate and rhythm, normal S1 S2, no S3 or S4, no murmur, click or rub, no peripheral edema and peripheral pulses strong  ABDOMEN: soft, nontender, no hepatosplenomegaly, no masses and bowel sounds normal  MS: " no gross musculoskeletal defects noted, no edema  SKIN: no suspicious lesions or rashes  NEURO: Normal strength and tone, mentation intact and speech normal  BACK: no CVA tenderness, no paralumbar tenderness  PSYCH: mentation appears normal, affect normal/bright  LYMPH: normal ant/post cervical, supraclavicular nodes    Results for orders placed or performed in visit on 12/31/20 (from the past 24 hour(s))   Basic metabolic panel  (Ca, Cl, CO2, Creat, Gluc, K, Na, BUN)   Result Value Ref Range    Sodium 137 133 - 144 mmol/L    Potassium 3.8 3.4 - 5.3 mmol/L    Chloride 106 94 - 109 mmol/L    Carbon Dioxide PENDING 20 - 32 mmol/L    Anion Gap PENDING 3 - 14 mmol/L    Glucose PENDING 70 - 99 mg/dL    Urea Nitrogen PENDING 7 - 30 mg/dL    Creatinine PENDING 0.52 - 1.04 mg/dL    GFR Estimate PENDING >60 mL/min/[1.73_m2]    GFR Estimate If Black PENDING >60 mL/min/[1.73_m2]    Calcium PENDING 8.5 - 10.1 mg/dL           Assessment & Plan     Elevated BP without diagnosis of hypertension  We discussed changing from COCP's to progesterone only form of contraception and seeing if this helps with her BP.  She'll schedule back for Nexplanon placement, recheck BP at that time, could consider Calcium channel blocker or betablocker for  BP and migraine prevention if needed. She does admits to anxiety when coming to the clinic to have her BP checked.  Form completed for her to get her teeth cleaned as her BP was elevated at the denitst.  - Basic metabolic panel  (Ca, Cl, CO2, Creat, Gluc, K, Na, BUN)  - Albumin Random Urine Quantitative with Creat Ratio    Migraine without status migrainousus, not intractable, unspecified migraine type  As she has not had a migraine in some time, she'll keep a headache log so we know how often she is actually getting migraines.    Need for hepatitis C screening test    - Hepatitis C Screen Reflex to HCV RNA Quant and Genotype    Need for influenza vaccination         BMI:   Estimated body mass  "index is 29.09 kg/m  as calculated from the following:    Height as of this encounter: 1.56 m (5' 1.4\").    Weight as of this encounter: 70.8 kg (156 lb).   Weight management plan: Discussed healthy diet and exercise guidelines         Work on weight loss  Regular exercise  See Patient Instructions    Return in about 2 weeks (around 1/14/2021) for Follow up.    TRINY Jim[facility]nez [facility]    "

## 2020-12-31 NOTE — PATIENT INSTRUCTIONS
Patient Education     Etonogestrel implant  What is this medicine?  ETONOGESTREL (et oh sbe PIA trel) is a contraceptive (birth control) device. It is used to prevent pregnancy. It can be used for up to 3 years.  How should I use this medicine?  This device is inserted just under the skin on the inner side of your upper arm by a health care professional.  Talk to your pediatrician regarding the use of this medicine in children. Special care may be needed.  What side effects may I notice from receiving this medicine?  Side effects that you should report to your doctor or health care professional as soon as possible:    allergic reactions like skin rash, itching or hives, swelling of the face, lips, or tongue    breast lumps    changes in emotions or moods    depressed mood    heavy or prolonged menstrual bleeding    pain, irritation, swelling, or bruising at the insertion site    scar at site of insertion    signs of infection at the insertion site such as fever, and skin redness, pain or discharge    signs of pregnancy    signs and symptoms of a blood clot such as breathing problems; changes in vision; chest pain; severe, sudden headache; pain, swelling, warmth in the leg; trouble speaking; sudden numbness or weakness of the face, arm or leg    signs and symptoms of liver injury like dark yellow or brown urine; general ill feeling or flu-like symptoms; light-colored stools; loss of appetite; nausea; right upper belly pain; unusually weak or tired; yellowing of the eyes or skin    unusual vaginal bleeding, discharge    signs and symptoms of a stroke like changes in vision; confusion; trouble speaking or understanding; severe headaches; sudden numbness or weakness of the face, arm or leg; trouble walking; dizziness; loss of balance or coordination  Side effects that usually do not require medical attention (report to your doctor or health care professional if they continue or are bothersome):    acne    back  pain    breast pain    changes in weight    dizziness    general ill feeling or flu-like symptoms    headache    irregular menstrual bleeding    nausea    sore throat    vaginal irritation or inflammation  What may interact with this medicine?  Do not take this medicine with any of the following medications:    amprenavir    fosamprenavir  This medicine may also interact with the following medications:    acitretin    aprepitant    armodafinil    bexarotene    bosentan    carbamazepine    certain medicines for fungal infections like fluconazole, ketoconazole, itraconazole and voriconazole    certain medicines to treat hepatitis, HIV or AIDS    cyclosporine    felbamate    griseofulvin    lamotrigine    modafinil    oxcarbazepine    phenobarbital    phenytoin    primidone    rifabutin    rifampin    rifapentine    [place]'s wort    topiramate  What if I miss a dose?  This does not apply.  Where should I keep my medicine?  This drug is given in a hospital or clinic and will not be stored at home.  What should I tell my health care provider before I take this medicine?  They need to know if you have any of these conditions:    abnormal vaginal bleeding    blood vessel disease or blood clots    breast, cervical, endometrial, ovarian, liver, or uterine cancer    diabetes    gallbladder disease    heart disease or recent heart attack    high blood pressure    high cholesterol or triglycerides    kidney disease    liver disease    migraine headaches    seizures    stroke    tobacco smoker    an unusual or allergic reaction to etonogestrel, anesthetics or antiseptics, other medicines, foods, dyes, or preservatives    pregnant or trying to get pregnant    breast-feeding  What should I watch for while using this medicine?  This product does not protect you against HIV infection (AIDS) or other sexually transmitted diseases.  You should be able to feel the implant by pressing your fingertips over the skin where it was  inserted. Contact your doctor if you cannot feel the implant, and use a non-hormonal birth control method (such as condoms) until your doctor confirms that the implant is in place. Contact your doctor if you think that the implant may have broken or become bent while in your arm.  You will receive a user card from your health care provider after the implant is inserted. The card is a record of the location of the implant in your upper arm and when it should be removed. Keep this card with your health records.  NOTE:This sheet is a summary. It may not cover all possible information. If you have questions about this medicine, talk to your doctor, pharmacist, or health care provider. Copyright  2020 [employer]           Patient Education     Etonogestrel implant  What is this medicine?  ETONOGESTREL (et oh seb PIA trel) is a contraceptive (birth control) device. It is used to prevent pregnancy. It can be used for up to 3 years.  How should I use this medicine?  This device is inserted just under the skin on the inner side of your upper arm by a health care professional.  Talk to your pediatrician regarding the use of this medicine in children. Special care may be needed.  What side effects may I notice from receiving this medicine?  Side effects that you should report to your doctor or health care professional as soon as possible:    allergic reactions like skin rash, itching or hives, swelling of the face, lips, or tongue    breast lumps    changes in emotions or moods    depressed mood    heavy or prolonged menstrual bleeding    pain, irritation, swelling, or bruising at the insertion site    scar at site of insertion    signs of infection at the insertion site such as fever, and skin redness, pain or discharge    signs of pregnancy    signs and symptoms of a blood clot such as breathing problems; changes in vision; chest pain; severe, sudden headache; pain, swelling, warmth in the leg; trouble speaking; sudden  numbness or weakness of the face, arm or leg    signs and symptoms of liver injury like dark yellow or brown urine; general ill feeling or flu-like symptoms; light-colored stools; loss of appetite; nausea; right upper belly pain; unusually weak or tired; yellowing of the eyes or skin    unusual vaginal bleeding, discharge    signs and symptoms of a stroke like changes in vision; confusion; trouble speaking or understanding; severe headaches; sudden numbness or weakness of the face, arm or leg; trouble walking; dizziness; loss of balance or coordination  Side effects that usually do not require medical attention (report to your doctor or health care professional if they continue or are bothersome):    acne    back pain    breast pain    changes in weight    dizziness    general ill feeling or flu-like symptoms    headache    irregular menstrual bleeding    nausea    sore throat    vaginal irritation or inflammation  What may interact with this medicine?  Do not take this medicine with any of the following medications:    amprenavir    fosamprenavir  This medicine may also interact with the following medications:    acitretin    aprepitant    armodafinil    bexarotene    bosentan    carbamazepine    certain medicines for fungal infections like fluconazole, ketoconazole, itraconazole and voriconazole    certain medicines to treat hepatitis, HIV or AIDS    cyclosporine    felbamate    griseofulvin    lamotrigine    modafinil    oxcarbazepine    phenobarbital    phenytoin    primidone    rifabutin    rifampin    rifapentine    [place]'s wort    topiramate  What if I miss a dose?  This does not apply.  Where should I keep my medicine?  This drug is given in a hospital or clinic and will not be stored at home.  What should I tell my health care provider before I take this medicine?  They need to know if you have any of these conditions:    abnormal vaginal bleeding    blood vessel disease or blood clots    breast,  cervical, endometrial, ovarian, liver, or uterine cancer    diabetes    gallbladder disease    heart disease or recent heart attack    high blood pressure    high cholesterol or triglycerides    kidney disease    liver disease    migraine headaches    seizures    stroke    tobacco smoker    an unusual or allergic reaction to etonogestrel, anesthetics or antiseptics, other medicines, foods, dyes, or preservatives    pregnant or trying to get pregnant    breast-feeding  What should I watch for while using this medicine?  This product does not protect you against HIV infection (AIDS) or other sexually transmitted diseases.  You should be able to feel the implant by pressing your fingertips over the skin where it was inserted. Contact your doctor if you cannot feel the implant, and use a non-hormonal birth control method (such as condoms) until your doctor confirms that the implant is in place. Contact your doctor if you think that the implant may have broken or become bent while in your arm.  You will receive a user card from your health care provider after the implant is inserted. The card is a record of the location of the implant in your upper arm and when it should be removed. Keep this card with your health records.  NOTE:This sheet is a summary. It may not cover all possible information. If you have questions about this medicine, talk to your doctor, pharmacist, or health care provider. Copyright  2020 [employer]           Patient Education     High Blood Pressure, To Be Confirmed, No Treatment   Your blood pressure today was higher than normal. Sometimes anxiety, pain, or other issues can cause a short-term rise in blood pressure. It later returns to normal. Blood pressure that is high only one time doesn t mean that you have high blood pressure (hypertension). High blood pressure is a long-term (chronic) illness. But you should have your blood pressure measured again in the next few days to find out if it s still  high.     Blood pressure measurements are given as 2 numbers. Systolic blood pressure is the upper number. This is the pressure when the heart contracts. Diastolic blood pressure is the lower number. This is the pressure when the heart relaxes between beats. You will see your blood pressure readings written together. For example, a person with a systolic pressure of 118 and a diastolic pressure of 78 will have 118/78 written in the medical record.   Blood pressure is classified as normal, raised (elevated), or stage 1 or stage 2 high blood pressure:     Normal blood pressure. Systolic of less than 120 and diastolic of less than 80 (120/80).    Elevated blood pressure. Systolic of 120 to 129 and diastolic less than 80.    Stage 1 high blood pressure. Systolic is 130 to 139 or diastolic between 80 to 89.    Stage 2 high blood pressure. Systolic is 140 or higher or the diastolic is 90 or higher.  Lifestyle changes can help manage your blood pressure. These include weight loss, exercise, and quitting smoking. Have your blood pressure checked regularly to be sure it is under control.   Home care  To track your blood pressure, your healthcare provider may ask you to come into the office at different times and on different days. If your provider asks you to check your readings at home, ask him or her what times of the day to test and for how many days. Before you leave the office, ask your provider to show you how to take your blood pressure. Ask questions if you don't understand something.   Using a home blood pressure monitor  Think about buying an automatic blood pressure monitor. Ask your provider for a recommendation as well as the correct size cuff to fit your arm. You can buy blood pressure monitors at most pharmacies.   The American Heart Association advises the following guidelines for home blood pressure monitoring:     Don't smoke or drink coffee or other caffeinated drinks for 30 minutes before taking your  blood pressure.    Go to the bathroom before the test.    Relax for 5 minutes before taking the measurement.    Sit with your back supported (don't sit on a couch or soft chair). Keep your feet on the floor uncrossed. Place your arm on a solid flat surface (like a table) with the upper part of the arm at heart level. Place the middle of the cuff directly above the bend of the elbow. Check the monitor's instruction manual for an illustration.    Take multiple readings. When you measure, take 2 to 3 readings one minute apart. Record all of the results.    Take your blood pressure at the same time every day, or as your provider advises.    Record the date, time, and blood pressure reading.    Take the record with you to your next medical appointment. If your blood pressure monitor has a built-in memory, simply take the monitor with you to your next appointment.    Call your provider if you have several high readings. Don't be frightened by a single high blood pressure reading. But if you get a few high readings, check in with your provider.  Follow-up care  Keep all of your follow-up appointments. If your blood pressure is more than 120 over 80 on 2 out of 3 days, you will need to follow up with your healthcare provider for more evaluation and treatment.   Don t put this off! High blood pressure can be treated. High blood pressure that s not treated raises your risk for heart attack, heart failure, kidney disease, and stroke.   Call 911  Call 911 if you have any of these:     Blood pressure of 180/120 or higher    Chest pain or shortness of breath    Weakness of an arm or leg or one side of the face    Problems speaking or seeing  When to get medical advice  Call your healthcare provider right away if any of these occur:     Severe headache    Throbbing or rushing sound in the ears    Nosebleed    Sudden severe pain in your belly (abdomen)    Extreme drowsiness, confusion, or fainting    Dizziness or dizziness with  spinning feeling (vertigo)  [employer] last reviewed this educational content on 7/1/2019 2000-2020 [employer]. [address] [place] [address], [place] [address]. All rights reserved. This information is not intended as a substitute for professional medical care. Always follow your healthcare professional's instructions.

## 2021-01-03 LAB — HCV AB SERPL QL IA: NONREACTIVE

## 2021-01-18 ENCOUNTER — OFFICE VISIT (OUTPATIENT)
Dept: FAMILY MEDICINE | Facility: CLINIC | Age: 36
End: 2021-01-18
Payer: COMMERCIAL

## 2021-01-18 VITALS
DIASTOLIC BLOOD PRESSURE: 91 MMHG | HEART RATE: 88 BPM | WEIGHT: 153 LBS | BODY MASS INDEX: 28.16 KG/M2 | HEIGHT: 62 IN | SYSTOLIC BLOOD PRESSURE: 139 MMHG | TEMPERATURE: 99.3 F | OXYGEN SATURATION: 100 %

## 2021-01-18 DIAGNOSIS — R03.0 ELEVATED BP WITHOUT DIAGNOSIS OF HYPERTENSION: ICD-10-CM

## 2021-01-18 DIAGNOSIS — Z30.017 NEXPLANON INSERTION: Primary | ICD-10-CM

## 2021-01-18 PROBLEM — Z30.41 ENCOUNTER FOR SURVEILLANCE OF CONTRACEPTIVE PILLS: Status: RESOLVED | Noted: 2017-07-26 | Resolved: 2021-01-18

## 2021-01-18 LAB — HCG UR QL: NEGATIVE

## 2021-01-18 PROCEDURE — 11981 INSERTION DRUG DLVR IMPLANT: CPT | Performed by: NURSE PRACTITIONER

## 2021-01-18 PROCEDURE — 99207 PR NO CHARGE LOS: CPT | Performed by: NURSE PRACTITIONER

## 2021-01-18 PROCEDURE — 81025 URINE PREGNANCY TEST: CPT | Performed by: NURSE PRACTITIONER

## 2021-01-18 ASSESSMENT — MIFFLIN-ST. JEOR: SCORE: 1344.31

## 2021-01-18 ASSESSMENT — PAIN SCALES - GENERAL: PAINLEVEL: NO PAIN (0)

## 2021-01-18 NOTE — PROGRESS NOTES
"  Assessment & Plan     Nexplanon insertion    We reviewed the mechanism of actions, goals and limitations of the use of the medication, as well as the contraindications.  Bleeding patterns were also reviewed with her. She voiced her understanding.  The patient and I reviewed implanon removal, and should she choose to have the implanon removed, she needs to have someone trained for the insertion and removal.Informed consent obtained.    Procedure Note - Etonogestrel Implant Insertion     HPI: Gudelia Albarran is here for Nexplanon (etonogestrel implant) insertion.   Indication: unwanted fertility  BP (!) 139/91   Pulse 88   Temp 99.3  F (37.4  C) (Tympanic)   Ht 1.562 m (5' 1.5\")   Wt 69.4 kg (153 lb)   LMP 12/25/2020   SpO2 100%   Breastfeeding No   BMI 28.44 kg/m    Previous STD testing in the past 1 year? No  Labs: UPT negative    Prev Contraception? oral contraceptives  Smoking?  No    Counselling and Consent:  Affirmation of informed consent was signed and scanned into the medical record. Risks, benefits and alternatives were discussed. Discussed potential side effects of the etonogestrel implant including the risk of irregular bleeding that may persist across the 3 yrs of use.  Instructed on use of condoms for STI prevention.  Patient's questions were elicited and answered.            Preoperative Diagnosis:  Unwanted fertility  Postoperative Diagnosis:  same     Technique:   Skin prep Betadine  Anesthesia 1% lidocaine, with epi  EBL:   minimal  Complications: No  Tolerance:  Pt tolerated procedure well and was in stable condition.     Pt was positioned on exam table with left arm flexed and externally rotated. Area was marked for insertion 8cm from the medial epicondyle below the sulcus between the biceps and triceps. Anesthesia provided at the insertion site and along the insertion track and then the area was prepped with betadine. Etonogestrel implant was then inserted subdermally in usual fashion. " "Provider and patient confirmed placement by palpating the device. Pressure dressing applied and procedure complete.     Follow up:  Pt was instructed to call if bleeding, severe pain or foul smell.  Instructed to remove pressure dressing after 24 hours, then may keep insertion site covered with a bandaid until it is healed.  She was given her user card with the lot number. The patient tolerated the procedure well.  No apparent complications.     nstructed that she requires removal or replacement of the device in 3 years.      Follow-up as needed.         See Patient Instructions    No follow-ups on file.    TRINY Jiménez [facility]    Gaby Galeas is a 35 year old who presents to clinic today for the following health issue    HPI     Nexplanon insert.      Review of Systems   Constitutional, HEENT, cardiovascular, pulmonary, gi and gu systems are negative, except as otherwise noted.      Objective    BP (!) 139/91   Pulse 88   Temp 99.3  F (37.4  C) (Tympanic)   Ht 1.562 m (5' 1.5\")   Wt 69.4 kg (153 lb)   LMP 12/25/2020   SpO2 100%   Breastfeeding No   BMI 28.44 kg/m    Body mass index is 28.44 kg/m .  Physical Exam   GENERAL: healthy, alert and no distress  EYES: Eyes grossly normal to inspection, PERRL and conjunctivae and sclerae normal  HENT: ear canals and TM's normal, nose and mouth without ulcers or lesions  NECK: no adenopathy, no asymmetry, masses, or scars and thyroid normal to palpation  RESP: lungs clear to auscultation - no rales, rhonchi or wheezes  CV: regular rate and rhythm, normal S1 S2, no S3 or S4, no murmur, click or rub, no peripheral edema and peripheral pulses strong  ABDOMEN: soft, nontender, no hepatosplenomegaly, no masses and bowel sounds normal  MS: no gross musculoskeletal defects noted, no edema  SKIN: no suspicious lesions or rashes  NEURO: Normal strength and tone, mentation intact and speech normal  BACK: no CVA " tenderness, no paralumbar tenderness  PSYCH: mentation appears normal, affect normal/bright  LYMPH: normal ant/post cervical, supraclavicular nodes    No results found for this or any previous visit (from the past 24 hour(s)).    I spent a total of 42 minutes face-to-face with Gudelia Albarran during today's office visit.  Over 50% of this time was spent counseling the patient and/or coordinating care regarding Nexplanon use, side effects, indications for return to clinic..  See note for details.

## 2021-01-18 NOTE — PATIENT INSTRUCTIONS
At [facility], we strive to deliver an exceptional experience to you, every time we see you. If you receive a survey, please complete it as we do value your feedback.  If you have MyChart, you can expect to receive results automatically within 24 hours of their completion.  Your provider will send a note interpreting your results as well.   If you do not have MyChart, you should receive your results in about a week by mail.    Your care team:                            Family Medicine Internal Medicine   MD Hubert Medley, MD Alena Christianson, MD Nevaeh Solano PA-C, APRN CNP    Joseph Jensen, MD Pediatrics   [place], PABhavaniC  Concetta Rubin, CNP Betina Whelan, MD Comfort Bolton APRN CNP   MD Ree Padgett MD Deborah Mielke, MD Jeanine Oakley, APRN CNP  Shayna Hayden, PASYED Song, CNP  MD Dede Kolb MD Angela Wermerskirchen, MD      Clinic hours: Monday - Thursday 7 am-7 pm; Fridays 7 am-5 pm.   Urgent care: Monday - Friday 11 am-9 pm; Saturday and Sunday 9 am-5 pm.    Clinic: [phone]       [facility]: Monday - Thursday 8 am - 7 pm; Friday 8 am - 6 pm  [facility]: [phone]     Use www.[email or web address].org for 24/7 diagnosis and treatment of dozens of conditions.    We placed the Nexplanon today.  Congratulations on choosing such an effective birth control!  -Use condoms for the next 7 days, after that, you are protected against pregnancy.  You should still use condoms to protect against  sexually transmitted infections.  -Remove the gauze dressing tomorrow, you can shower normally at that time.  -The little white bandaids will fall off after about one week.  This is fine.  -There should be minimal scarring; if you have any redness, tenderness, or drainage from the site, call right away.  -Remember to expect  irregular bleeding, this is a normal side effect of Nexplanon  -Call with any questions or concerns!

## 2021-02-08 DIAGNOSIS — Z30.41 ENCOUNTER FOR SURVEILLANCE OF CONTRACEPTIVE PILLS: ICD-10-CM

## 2021-02-09 RX ORDER — NORETHINDRONE AND ETHINYL ESTRADIOL 0.8-25(24)
KIT ORAL
Qty: 84 TABLET | Refills: 0 | Status: SHIPPED | OUTPATIENT
Start: 2021-02-09 | End: 2021-03-17

## 2021-02-09 NOTE — TELEPHONE ENCOUNTER
Prescription approved per [facility] Refill Protocol.  Need to keep upcoming appointment for further refills  Next 5 appointments (look out 90 days)    Mar 01, 2021  2:00 PM  MyChart Short with TRINY Neumann CNP  [facility] ([facility] ) [address] [place] [id number]  [phone]        Eli Bell RN

## 2021-03-17 ENCOUNTER — VIRTUAL VISIT (OUTPATIENT)
Dept: FAMILY MEDICINE | Facility: CLINIC | Age: 36
End: 2021-03-17
Payer: COMMERCIAL

## 2021-03-17 DIAGNOSIS — R03.0 ELEVATED BP WITHOUT DIAGNOSIS OF HYPERTENSION: Primary | ICD-10-CM

## 2021-03-17 PROCEDURE — 99213 OFFICE O/P EST LOW 20 MIN: CPT | Mod: 95 | Performed by: NURSE PRACTITIONER

## 2021-03-17 NOTE — PATIENT INSTRUCTIONS
Patient Education     Lowering Your Blood Pressure with DASH  What is the DASH eating plan?  DASH (Dietary Approaches to Stop Hypertension) can help you prevent or lower high blood pressure. This eating plan provides the nutrients that help lower blood pressure: potassium, magnesium, calcium, protein and fiber.   If not controlled, high blood pressure may lead to heart disease, stroke or blindness.  This eating plan is rich in:     Fruits and vegetables    Whole grains    Fat-free or low-fat milk products    Fish and poultry (chicken, turkey, etc.)    Beans, seeds and nuts.  This eating plan is low in:     Salt and sodium    Sugar, sweets and drinks with sugar    Red meat, saturated fat and trans fat.  Lifestyle changes  Besides a healthy eating plan, other steps are needed to help control high blood pressure.     Stay at a healthy weight.    Be active for at least 30 minutes on most days.    If you drink alcohol, have no more than two drinks per day (for men) or one per day (for women).    If you take blood pressure medicine, take it as directed.  Losing weight with DASH  You can lose weight by eating fewer calories. It is best to take off pounds slowly over time. Talk to a dietitian about the best way to do this.  Staying active   To shed pounds, combine DASH with daily exercise. Start with a 15-minute walk each day. Slowly increase the time until you reach a total of 30 minutes on most days. To avoid weight gain, try for 60 minutes each day. Check with your doctor before starting any exercise program.  Tips for less sodium    Avoid processed foods. These may include baked goods, cereals, soy sauce and even antacids.    Cook with less salt. Don't bring the saltshaker to the table.    Season food with herbs, spices, lemon, lime, vinegar, wine and salt-free seasoning blends.    Use low-sodium canned vegetables or fruits.  Tips to ease the change  Take a week or two to slowly make changes to your diet.    Add a  serving of vegetables at lunch one day. The next day, add a serving at dinner as well.    Add fruit to one meal or eat it as a snack.    Work up to three servings of fat-free and low-fat milk products each day.    If you eat large portions of meat, cut back by a third at each meal. The goal is to eat 6 oz (ounces) of meat or less per day.    Serve brown rice and whole-wheat bread and pasta.    Try casseroles and stir-sheehan dishes that have less meat and more vegetables, grains or cooked dry beans.    Serve two or more meatless meals each week.    For snacks and desserts, eat foods low in fat, sodium and sugar, such as:  ? Unsalted rice cakes, nuts or seeds  ? Fresh fruits, raw vegetables and raisins  ? Fat-free, low-fat or frozen yogurt  ? Popcorn with no salt or butter.    If you have trouble digesting milk products, try lactose-free milk or take lactase pills.    If you have a nut allergy, eat seeds, beans, lentils or split peas.    Fruits, vegetables and whole grain foods are high in fiber. To avoid bloating and diarrhea (loose, watery stools), increase these foods over several weeks.  The DASH eating plan  Use this chart to plan your meals. Or take it with you when you shop for food.   Food Group Servings per Day  Serving Size Examples    1,600 Calories 2,000 Calories 2,600 Calories      Grains  (whole wheat) 6 6 to 8 10 to 11   1 slice bread    1 oz dry cereal      cup cooked rice, pasta or cereal Whole-wheat bread and rolls, whole-wheat pasta, English muffin, vandana bread, bagel, cereals, grits, oatmeal, brown rice, unsalted pretzels and popcorn   Vegetables  3 to 4 4 to 5 5 to 6   1 cup raw leafy vegetables      cup cut-up raw or cooked vegetable      cup vegetable juice Broccoli, carrots, collards, green beans, green peas, kale, lima beans, potatoes, spinach, squash, sweet potatoes, tomatoes   Fruits 4 4 to 5 5 to 6   1 medium fruit      cup dried fruit      cup fresh, frozen, or canned fruit      cup fruit  juice Apples, apricots, bananas, dates, grapes, oranges, grapefruit, mangoes, melons, peaches, pineapples, raisins, strawberries, tangerines   Fat-free or   low-fat milk and milk products 2 to 3 2 to 3 3   1 cup milk or yogurt    1   oz cheese Fat-free or low-fat (1%) milk, buttermilk, cheese, regular or frozen yogurt   Lean meats, poultry and fish 3 to 6 6 or less 6   1 oz cooked meats, poultry (chicken, turkey) or fish    1 egg    2 egg whites Lean meats (trim away any fat, then broil, roast or poach); remove skin from poultry. Eggs are high in cholesterol, so limit egg yolks to four or less per week.   Nuts, seeds   and legumes 3 per week 4 to 5 per week 1 per day   ? cup (1   oz) nuts    2 Tbsp peanut butter    2 Tbsp (   oz) seeds      cup cooked legumes (dry beans and peas) Almonds, hazelnuts, mixed nuts, peanuts, walnuts, sunflower seeds, peanut butter, kidney beans, lentils, split peas   Fats and oils 2 2 to 3 3   1 tsp soft margarine    1 tsp vegetable oil    1 Tbsp bojorquez    2 Tbsp salad dressing Soft margarine, vegetable oil (such as canola, corn, olive or safflower), low-fat mayonnaise, light salad dressing   Sweets and   added sugars 0 5 or less per week 2 or less per day   1 Tbsp sugar, jelly or jam      cup sorbet or gelatin    1 cup lemonade Fruit-flavored gelatin, fruit punch, hard candy, jelly, maple syrup, sorbets and ices   A sample meal plan  This sample menu gives two sodium levels. Start with 2,300 mg of sodium (about 1 teaspoon of table salt per day). Then, try to lower your intake to 1,500 mg a day. Talk to your doctor or dietitian about how to do this.   These samples are for people who eat 2,000 calories per day. The less salt you eat, the more you may lower your blood pressure.   Menu for 2,300 mg sodium per day   Breakfast:   cup instant oatmeal, 1 mini whole-wheat bagel, 1 tablespoon peanut butter, 1 medium banana, 1 cup (8 ounces) low-fat milk.   Lunch: 1 cup cantaloupe chunks, 1 cup  "apple juice and one chicken breast sandwich that includes:    Two slices (3 ounces) chicken breast, no skin    Two slices whole-wheat bread    1 slice (   ounce) reduced-fat cheddar cheese    One leaf tonya lettuce    Two slices tomato    1 tablespoon low-fat mayonnaise.  Dinner: 1 cup cooked spaghetti,   cup low-salt vegetarian spaghetti sauce, 3 tablespoons Parmesan cheese;   cup corn (from frozen);   cup canned pears in juice; one spinach salad that includes:    1 cup fresh spinach leaves      cup fresh carrots, grated      cup fresh mushrooms, sliced    1 tablespoon vinegar and oil dressing.  Snacks:? cup unsalted almonds;   cup dried apricots; 1 cup fat-free fruit yogurt, no sugar added.  Reducing to 1,500 mg sodium per day  Use the same menu plan, but:    For breakfast, replace instant oatmeal with regular oatmeal and 1 teaspoon cinnamon.    For lunch, replace cheddar cheese with low-sodium Swiss cheese.  Resources on diet and health  National Heart, Lung and Blood [place]  [facility]  [address] [address]   [place] [address]   Phone: [phone]   TTY: [phone]   www.nhlbi.nih.gov   \"Aim for a Healthy Weight\"   www.nhlbi.nih.gov/health/public/heart/obesity/lose_wt/index.htm  \"Dietary Guidelines for Americans 2005\"   and \"A Healthier You\"   www.healthierus.gov/dietaryguidelines  For informational purposes only. Not to replace the advice of your health care provider. Adapted from \"Your Guide to Lowering Blood Pressure with DASH,\" by the National Heart, Lung and Blood [place],   NIH Publication No. , revised April 2006. Available at www.nhlbi.nih.gov/health/public/heart/hbp/dash/index.htm. Published by [employer]. [employer] [id number] - REV 09/15.  For informational purposes only. Not to replace the advice of your health care provider.  Copyright   2018 [employer]. All rights reserved.           "

## 2021-03-17 NOTE — PROGRESS NOTES
Gudelia is a 36 year old who is being evaluated via a billable video visit.      How would you like to obtain your AVS? MyChart  If the video visit is dropped, the invitation should be resent by: Text to cell phone: [phone]  Will anyone else be joining your video visit? No    Video Start Time: 9:39 AM    Assessment & Plan     Elevated BP without diagnosis of hypertension  Home BP's at goal. Continue to check BP's periodically, goal BP< 140/90, reviewed low salt diet, benefits of regular exercise/weight loss.           Regular exercise  See Patient Instructions    No follow-ups on file.    Ashlee Oakley, TRINY [facility]    Gaby Galeas is a 36 year old who presents for the following health issues  accompanied by her Self:    HPI     Discuss BP concerns after nexplanon being put in. F/U    Hypertension Follow-up      Do you check your blood pressure regularly outside of the clinic? Yes     Are you following a low salt diet? Yes    Are your blood pressures ever more than 140 on the top number (systolic) OR more   than 90 on the bottom number (diastolic), for example 140/90? NO  Home BP's 120-low 130's/70-80's, rarely has diastolic above 90.    How many servings of fruits and vegetables do you eat daily?  2-3    On average, how many sweetened beverages do you drink each day (Examples: soda, juice, sweet tea, etc.  Do NOT count diet or artificially sweetened beverages)?   0    How many days per week do you miss taking your medication? 0        Review of Systems   Constitutional, HEENT, cardiovascular, pulmonary, gi and gu systems are negative, except as otherwise noted.      Objective           Vitals:  No vitals were obtained today due to virtual visit.    Physical Exam   GENERAL: Healthy, alert and no distress  EYES: Eyes grossly normal to inspection.  No discharge or erythema, or obvious scleral/conjunctival abnormalities.  HENT: Normal cephalic/atraumatic.  External  ears, nose and mouth without ulcers or lesions.  No nasal drainage visible.  NECK: No asymmetry, visible masses or scars  RESP: No audible wheeze, cough, or visible cyanosis.  No visible retractions or increased work of breathing.    SKIN: Visible skin clear. No significant rash, abnormal pigmentation or lesions.  NEURO: Cranial nerves grossly intact.  Mentation and speech appropriate for age.  PSYCH: Mentation appears normal, affect normal/bright, judgement and insight intact, normal speech and appearance well-groomed.          Video-Visit Details    Type of service:  Video Visit    Video End Time:9:47 AM    Originating Location (pt. Location): Home    Distant Location (provider location):  [facility]     Platform used for Video Visit: Key Ring

## 2021-04-24 ENCOUNTER — HEALTH MAINTENANCE LETTER (OUTPATIENT)
Age: 36
End: 2021-04-24

## 2021-10-04 ENCOUNTER — HEALTH MAINTENANCE LETTER (OUTPATIENT)
Age: 36
End: 2021-10-04

## 2022-05-01 ASSESSMENT — ENCOUNTER SYMPTOMS
PARESTHESIAS: 0
NAUSEA: 0
NERVOUS/ANXIOUS: 0
DYSURIA: 0
FREQUENCY: 0
MYALGIAS: 0
COUGH: 0
HEMATOCHEZIA: 0
CONSTIPATION: 0
ARTHRALGIAS: 0
HEADACHES: 0
DIARRHEA: 0
SHORTNESS OF BREATH: 0
BREAST MASS: 0
WEAKNESS: 0
HEARTBURN: 0
ABDOMINAL PAIN: 0
FEVER: 0
CHILLS: 0
DIZZINESS: 0
SORE THROAT: 0
EYE PAIN: 0
JOINT SWELLING: 0
HEMATURIA: 0
PALPITATIONS: 0

## 2022-05-04 ENCOUNTER — OFFICE VISIT (OUTPATIENT)
Dept: FAMILY MEDICINE | Facility: CLINIC | Age: 37
End: 2022-05-04
Payer: COMMERCIAL

## 2022-05-04 VITALS
RESPIRATION RATE: 16 BRPM | SYSTOLIC BLOOD PRESSURE: 137 MMHG | HEIGHT: 61 IN | TEMPERATURE: 97.9 F | BODY MASS INDEX: 24.09 KG/M2 | WEIGHT: 127.6 LBS | HEART RATE: 69 BPM | OXYGEN SATURATION: 100 % | DIASTOLIC BLOOD PRESSURE: 84 MMHG

## 2022-05-04 DIAGNOSIS — B07.0 PLANTAR WARTS: ICD-10-CM

## 2022-05-04 DIAGNOSIS — Z00.00 ROUTINE HISTORY AND PHYSICAL EXAMINATION OF ADULT: Primary | ICD-10-CM

## 2022-05-04 PROCEDURE — 99395 PREV VISIT EST AGE 18-39: CPT | Mod: 25 | Performed by: NURSE PRACTITIONER

## 2022-05-04 PROCEDURE — 17110 DESTRUCTION B9 LES UP TO 14: CPT | Performed by: NURSE PRACTITIONER

## 2022-05-04 ASSESSMENT — ENCOUNTER SYMPTOMS
HEMATOCHEZIA: 0
HEMATURIA: 0
PARESTHESIAS: 0
NERVOUS/ANXIOUS: 0
DIZZINESS: 0
DYSURIA: 0
JOINT SWELLING: 0
EYE PAIN: 0
CHILLS: 0
COUGH: 0
SHORTNESS OF BREATH: 0
ABDOMINAL PAIN: 0
PALPITATIONS: 0
FEVER: 0
SORE THROAT: 0
CONSTIPATION: 0
HEARTBURN: 0
HEADACHES: 0
WEAKNESS: 0
MYALGIAS: 0
DIARRHEA: 0
BREAST MASS: 0
FREQUENCY: 0
NAUSEA: 0
ARTHRALGIAS: 0

## 2022-05-04 ASSESSMENT — PAIN SCALES - GENERAL: PAINLEVEL: NO PAIN (0)

## 2022-05-04 NOTE — PROGRESS NOTES
SUBJECTIVE:   CC: Gudelia Albarran is an 37 year old woman who presents for preventive health visit.       Patient has been advised of split billing requirements and indicates understanding: Yes     Healthy Habits:     Getting at least 3 servings of Calcium per day:  NO    Bi-annual eye exam:  Yes    Dental care twice a year:  Yes    Sleep apnea or symptoms of sleep apnea:  None    Diet:  Low salt and Breakfast skipped    Frequency of exercise:  4-5 days/week    Duration of exercise:  30-45 minutes    Taking medications regularly:  Yes    Medication side effects:  None    PHQ-2 Total Score: 0      WART(S)      Onset: right between 3rd and 4th toes, also 1 wart on plantar forefoot    Description (location/number): 3 warts    Accompanying signs and symptoms: Painful: no    History: prior warts: no     Therapies tried and outcome: None  Went to a pool and noted thickened skin on 3rd and 4th toes.    Today's PHQ-2 Score:   PHQ-2 ( 1999 [employer]) 5/1/2022   Q1: Little interest or pleasure in doing things 0   Q2: Feeling down, depressed or hopeless 0   PHQ-2 Score 0   PHQ-2 Total Score (12-17 Years)- Positive if 3 or more points; Administer PHQ-A if positive -   Q1: Little interest or pleasure in doing things Not at all   Q2: Feeling down, depressed or hopeless Not at all   PHQ-2 Score 0       Abuse: Current or Past (Physical, Sexual or Emotional) - No  Do you feel safe in your environment? Yes    Have you ever done Advance Care Planning? (For example, a Health Directive, POLST, or a discussion with a medical provider or your loved ones about your wishes): No, advance care planning information given to patient to review.  Patient declined advance care planning discussion at this time.    Social History     Tobacco Use     Smoking status: Never Smoker     Smokeless tobacco: Never Used   Substance Use Topics     Alcohol use: Yes     If you drink alcohol do you typically have >3 drinks per day or >7 drinks per week?  No    Alcohol Use 5/4/2022   Prescreen: >3 drinks/day or >7 drinks/week? -   Prescreen: >3 drinks/day or >7 drinks/week? No       Reviewed orders with patient.  Reviewed health maintenance and updated orders accordingly - Yes  Labs reviewed in EPIC  BP Readings from Last 3 Encounters:   05/04/22 137/84   01/18/21 (!) 139/91   12/31/20 (!) 144/86    Wt Readings from Last 3 Encounters:   05/04/22 57.9 kg (127 lb 9.6 oz)   01/18/21 69.4 kg (153 lb)   12/31/20 70.8 kg (156 lb)                  Patient Active Problem List   Diagnosis     Cubital tunnel syndrome on right     Elevated BP without diagnosis of hypertension     Past Surgical History:   Procedure Laterality Date     ENT SURGERY  1995    Tonsilectomy       Social History     Tobacco Use     Smoking status: Never Smoker     Smokeless tobacco: Never Used   Substance Use Topics     Alcohol use: Yes     Family History   Problem Relation Age of Onset     Breast Cancer Maternal Grandmother      Hypertension Paternal Grandfather      Cerebrovascular Disease Paternal Grandfather         late 80's     Breast Cancer Maternal Aunt         late 50's     Asthma Sister      Seizure Disorder Sister         trauma induced     Hypertension Father      Asthma Sister      Genetic Disorder Mother         Colitis     Diabetes No family hx of      Coronary Artery Disease No family hx of      Hyperlipidemia No family hx of      Colon Cancer No family hx of      Depression No family hx of      Anxiety Disorder No family hx of      Thyroid Disease No family hx of      Genetic Disorder No family hx of            Breast Cancer Screening:    FHS-7:   Breast CA Risk Assessment (FHS-7) 4/4/2022 5/1/2022   Did any of your first-degree relatives have breast or ovarian cancer? No No   Did any of your relatives have bilateral breast cancer? Unknown No   Did any man in your family have breast cancer? Unknown No   Did any woman in your family have breast and ovarian cancer? Yes Yes   Did any  woman in your family have breast cancer before age 50 y? Unknown No   Do you have 2 or more relatives with breast and/or ovarian cancer? Yes Yes   Do you have 2 or more relatives with breast and/or bowel cancer? No No     click delete button to remove this line now  Patient under 40 years of age: Routine Mammogram Screening not recommended.   Pertinent mammograms are reviewed under the imaging tab.    History of abnormal Pap smear: NO - age 30-65 PAP every 5 years with negative HPV co-testing recommended  PAP / HPV Latest Ref Rng & Units 1/7/2019 1/6/2016   PAP (Historical) - NIL NIL   HPV16 NEG:Negative Negative -   HPV18 NEG:Negative Negative -   HRHPV NEG:Negative Negative -     Reviewed and updated as needed this visit by clinical staff   Tobacco  Allergies  Meds   Med Hx  Surg Hx  Fam Hx  Soc Hx          Reviewed and updated as needed this visit by Provider         Fam Hx           Past Medical History:   Diagnosis Date     Hypertension 10/30/20    Reason for visit      Past Surgical History:   Procedure Laterality Date     ENT SURGERY  1995    Tonsilectomy       Review of Systems   Constitutional: Negative for chills and fever.   HENT: Negative for congestion, ear pain, hearing loss and sore throat.    Eyes: Negative for pain and visual disturbance.   Respiratory: Negative for cough and shortness of breath.    Cardiovascular: Negative for chest pain, palpitations and peripheral edema.   Gastrointestinal: Negative for abdominal pain, constipation, diarrhea, heartburn, hematochezia and nausea.   Breasts:  Negative for tenderness, breast mass and discharge.   Genitourinary: Negative for dysuria, frequency, genital sores, hematuria, pelvic pain, urgency, vaginal bleeding and vaginal discharge.   Musculoskeletal: Negative for arthralgias, joint swelling and myalgias.   Skin: Negative for rash.   Neurological: Negative for dizziness, weakness, headaches and paresthesias.   Psychiatric/Behavioral: Negative for  "mood changes. The patient is not nervous/anxious.       OBJECTIVE:   /84 (BP Location: Left arm, Patient Position: Sitting, Cuff Size: Adult Regular)   Pulse 69   Temp 97.9  F (36.6  C) (Oral)   Resp 16   Ht 1.549 m (5' 1\")   Wt 57.9 kg (127 lb 9.6 oz)   SpO2 100%   BMI 24.11 kg/m    Physical Exam  GENERAL: healthy, alert and no distress  EYES: Eyes grossly normal to inspection, PERRL and conjunctivae and sclerae normal  HENT: ear canals and TM's normal, nose and mouth without ulcers or lesions  NECK: no adenopathy, no asymmetry, masses, or scars and thyroid normal to palpation  RESP: lungs clear to auscultation - no rales, rhonchi or wheezes  BREAST: normal without masses, tenderness or nipple discharge and no palpable axillary masses or adenopathy  CV: regular rate and rhythm, normal S1 S2, no S3 or S4, no murmur, click or rub, no peripheral edema and peripheral pulses strong  ABDOMEN: soft, nontender, no hepatosplenomegaly, no masses and bowel sounds normal   (female): deferred  MS: no gross musculoskeletal defects noted, no edema  SKIN: no suspicious lesions or rashes  NEURO: Normal strength and tone, mentation intact and speech normal  PSYCH: mentation appears normal, affect normal/bright  LYMPH: no cervical, supraclavicular, axillary, or inguinal adenopathy    Diagnostic Test Results:  Labs reviewed in Epic  none     ASSESSMENT/PLAN:   (Z00.00) Routine history and physical examination of adult  (primary encounter diagnosis)  Comment:   Plan: Glucose            (B07.0) Plantar warts  Comment: After obtaining informed verbal consent, LN applied X 2. Patient tolerated procedure well, reviewed home care. After the lesion has fallen off, patient  can begin applying Compound W to the wart and cover with duct Tape. Leave on for 24 hours and then remove tape, soak foot, and repeat application of Compound W/duct tape until lesion is completely gone. Return to clinic in about 3 weeks for repeat " "application of LN if desired. Patient  is to wear flip flops when in the shower and should bleach the shower after use to prevent spread to other family members    Plan: DESTRUCT BENIGN LESION, UP TO 14           Patient has been advised of split billing requirements and indicates understanding: Yes    COUNSELING:  Reviewed preventive health counseling, as reflected in patient instructions    Estimated body mass index is 24.11 kg/m  as calculated from the following:    Height as of this encounter: 1.549 m (5' 1\").    Weight as of this encounter: 57.9 kg (127 lb 9.6 oz).        She reports that she has never smoked. She has never used smokeless tobacco.      Counseling Resources:  ATP IV Guidelines  Pooled Cohorts Equation Calculator  Breast Cancer Risk Calculator  BRCA-Related Cancer Risk Assessment: FHS-7 Tool  FRAX Risk Assessment  ICSI Preventive Guidelines  Dietary Guidelines for Americans, 2010  USDA's MyPlate  ASA Prophylaxis  Lung CA Screening    TRINY [facility]  "

## 2022-05-04 NOTE — PATIENT INSTRUCTIONS
At [facility], we strive to deliver an exceptional experience to you, every time we see you. If you receive a survey, please complete it as we do value your feedback.  If you have MyChart, you can expect to receive results automatically within 24 hours of their completion.  Your provider will send a note interpreting your results as well.   If you do not have MyChart, you should receive your results in about a week by mail.    Your care team:                            Family Medicine Internal Medicine   MD Hubert Medley MD Shantel Branch-Fleming, MD Srinivasa Vaka, MD Katya Belousova, TRINY Strange CNP, MD ([facility]   Tyson Woods, MD Betina Michelle MD Amelia Massimini APRN CNP Kim Thein, APRN CNP Bethany Templen, MD             Clinic hours: Monday - Thursday 7 am-6 pm; Fridays 7 am-5 pm.   Urgent care: Monday - Friday 10 am- 8 pm; Saturday and Sunday 9 am-5 pm.    Clinic: [phone]       [facility]: Monday - Thursday 8 am - 7 pm; Friday 8 am - 6 pm  [facility]: [phone]

## 2022-05-05 ENCOUNTER — MYC MEDICAL ADVICE (OUTPATIENT)
Dept: FAMILY MEDICINE | Facility: CLINIC | Age: 37
End: 2022-05-05
Payer: COMMERCIAL

## 2022-09-11 ENCOUNTER — HEALTH MAINTENANCE LETTER (OUTPATIENT)
Age: 37
End: 2022-09-11

## 2023-07-29 ENCOUNTER — HEALTH MAINTENANCE LETTER (OUTPATIENT)
Age: 38
End: 2023-07-29

## 2023-11-10 ENCOUNTER — MYC MEDICAL ADVICE (OUTPATIENT)
Dept: FAMILY MEDICINE | Facility: CLINIC | Age: 38
End: 2023-11-10
Payer: COMMERCIAL

## 2024-01-02 ASSESSMENT — ENCOUNTER SYMPTOMS
WEAKNESS: 0
DIARRHEA: 0
ARTHRALGIAS: 0
DIZZINESS: 0
HEMATURIA: 0
FEVER: 0
NERVOUS/ANXIOUS: 0
BREAST MASS: 0
HEARTBURN: 0
HEADACHES: 1
PALPITATIONS: 1
DYSURIA: 0
NAUSEA: 0
SHORTNESS OF BREATH: 0
PARESTHESIAS: 0
MYALGIAS: 0
SORE THROAT: 0
CHILLS: 0
CONSTIPATION: 0
COUGH: 0
JOINT SWELLING: 0
HEMATOCHEZIA: 0
FREQUENCY: 0
EYE PAIN: 0
ABDOMINAL PAIN: 0

## 2024-01-04 ENCOUNTER — OFFICE VISIT (OUTPATIENT)
Dept: FAMILY MEDICINE | Facility: CLINIC | Age: 39
End: 2024-01-04
Payer: COMMERCIAL

## 2024-01-04 VITALS
DIASTOLIC BLOOD PRESSURE: 89 MMHG | OXYGEN SATURATION: 98 % | SYSTOLIC BLOOD PRESSURE: 150 MMHG | WEIGHT: 152 LBS | BODY MASS INDEX: 27.97 KG/M2 | HEART RATE: 75 BPM | HEIGHT: 62 IN | RESPIRATION RATE: 20 BRPM | TEMPERATURE: 97.7 F

## 2024-01-04 DIAGNOSIS — Z23 NEED FOR TDAP VACCINATION: ICD-10-CM

## 2024-01-04 DIAGNOSIS — Z12.4 CERVICAL CANCER SCREENING: ICD-10-CM

## 2024-01-04 DIAGNOSIS — R03.0 ELEVATED BP WITHOUT DIAGNOSIS OF HYPERTENSION: ICD-10-CM

## 2024-01-04 DIAGNOSIS — Z00.00 ROUTINE GENERAL MEDICAL EXAMINATION AT A HEALTH CARE FACILITY: Primary | ICD-10-CM

## 2024-01-04 LAB
ANION GAP SERPL CALCULATED.3IONS-SCNC: 9 MMOL/L (ref 7–15)
BUN SERPL-MCNC: 12.5 MG/DL (ref 6–20)
CALCIUM SERPL-MCNC: 9.3 MG/DL (ref 8.6–10)
CHLORIDE SERPL-SCNC: 103 MMOL/L (ref 98–107)
CREAT SERPL-MCNC: 0.7 MG/DL (ref 0.51–0.95)
CREAT UR-MCNC: 211 MG/DL
DEPRECATED HCO3 PLAS-SCNC: 28 MMOL/L (ref 22–29)
EGFRCR SERPLBLD CKD-EPI 2021: >90 ML/MIN/1.73M2
GLUCOSE SERPL-MCNC: 90 MG/DL (ref 70–99)
MICROALBUMIN UR-MCNC: 26.6 MG/L
MICROALBUMIN/CREAT UR: 12.61 MG/G CR (ref 0–25)
POTASSIUM SERPL-SCNC: 3.7 MMOL/L (ref 3.4–5.3)
SODIUM SERPL-SCNC: 140 MMOL/L (ref 135–145)

## 2024-01-04 PROCEDURE — G0145 SCR C/V CYTO,THINLAYER,RESCR: HCPCS | Performed by: NURSE PRACTITIONER

## 2024-01-04 PROCEDURE — 82043 UR ALBUMIN QUANTITATIVE: CPT | Performed by: NURSE PRACTITIONER

## 2024-01-04 PROCEDURE — 99213 OFFICE O/P EST LOW 20 MIN: CPT | Mod: 25 | Performed by: NURSE PRACTITIONER

## 2024-01-04 PROCEDURE — 90471 IMMUNIZATION ADMIN: CPT | Performed by: NURSE PRACTITIONER

## 2024-01-04 PROCEDURE — 80048 BASIC METABOLIC PNL TOTAL CA: CPT | Performed by: NURSE PRACTITIONER

## 2024-01-04 PROCEDURE — 36415 COLL VENOUS BLD VENIPUNCTURE: CPT | Performed by: NURSE PRACTITIONER

## 2024-01-04 PROCEDURE — 82570 ASSAY OF URINE CREATININE: CPT | Performed by: NURSE PRACTITIONER

## 2024-01-04 PROCEDURE — 99395 PREV VISIT EST AGE 18-39: CPT | Mod: 25 | Performed by: NURSE PRACTITIONER

## 2024-01-04 PROCEDURE — 87624 HPV HI-RISK TYP POOLED RSLT: CPT | Performed by: NURSE PRACTITIONER

## 2024-01-04 PROCEDURE — 90715 TDAP VACCINE 7 YRS/> IM: CPT | Performed by: NURSE PRACTITIONER

## 2024-01-04 ASSESSMENT — ENCOUNTER SYMPTOMS
HEMATURIA: 0
SHORTNESS OF BREATH: 0
PALPITATIONS: 1
SORE THROAT: 0
JOINT SWELLING: 0
HEARTBURN: 0
DIARRHEA: 0
ARTHRALGIAS: 0
WEAKNESS: 0
HEMATOCHEZIA: 0
CONSTIPATION: 0
PARESTHESIAS: 0
BREAST MASS: 0
NAUSEA: 0
FREQUENCY: 0
EYE PAIN: 0
ABDOMINAL PAIN: 0
COUGH: 0
DIZZINESS: 0
HEADACHES: 1
MYALGIAS: 0
CHILLS: 0
NERVOUS/ANXIOUS: 0
FEVER: 0
DYSURIA: 0

## 2024-01-04 ASSESSMENT — PAIN SCALES - GENERAL: PAINLEVEL: NO PAIN (0)

## 2024-01-04 NOTE — PROGRESS NOTES
SUBJECTIVE:   Gudelia is a 38 year old, presenting for the following:  Physical        1/4/2024     7:30 AM   Additional Questions   Roomed by sharath         1/4/2024     7:30 AM   Patient Reported Additional Medications   Patient reports taking the following new medications none       Healthy Habits:     Getting at least 3 servings of Calcium per day:  NO    Bi-annual eye exam:  Yes    Dental care twice a year:  Yes    Sleep apnea or symptoms of sleep apnea:  None    Diet:  Low salt    Frequency of exercise:  2-3 days/week    Duration of exercise:  15-30 minutes    Taking medications regularly:  Yes    Medication side effects:  Not applicable    Additional concerns today:  Yes      Today's PHQ-2 Score:       1/4/2024     7:09 AM   PHQ-2 ( 1999 [employer])   Q1: Little interest or pleasure in doing things 0   Q2: Feeling down, depressed or hopeless 0   PHQ-2 Score 0   Q1: Little interest or pleasure in doing things Not at all   Q2: Feeling down, depressed or hopeless Not at all   PHQ-2 Score 0       Elevated BP-home BP's in the 130/80's, occasionally has sBP > 140, doing well on low salt diet.      Social History     Tobacco Use    Smoking status: Never    Smokeless tobacco: Never   Substance Use Topics    Alcohol use: Yes     Comment: An occasional glass of wine with dinner             1/2/2024     9:47 AM   Alcohol Use   Prescreen: >3 drinks/day or >7 drinks/week? No     Reviewed orders with patient.  Reviewed health maintenance and updated orders accordingly - Yes  Labs reviewed in EPIC  BP Readings from Last 3 Encounters:   01/04/24 (!) 150/89   05/04/22 137/84   01/18/21 (!) 139/91    Wt Readings from Last 3 Encounters:   01/04/24 68.9 kg (152 lb)   05/04/22 57.9 kg (127 lb 9.6 oz)   01/18/21 69.4 kg (153 lb)                  Patient Active Problem List   Diagnosis    Cubital tunnel syndrome on right    Elevated BP without diagnosis of hypertension     Past Surgical History:   Procedure Laterality Date    ENT  SURGERY  1995    Tonsilectomy       Social History     Tobacco Use    Smoking status: Never    Smokeless tobacco: Never   Substance Use Topics    Alcohol use: Yes     Comment: An occasional glass of wine with dinner     Family History   Problem Relation Age of Onset    Breast Cancer Maternal Grandmother     Hypertension Paternal Grandfather     Cerebrovascular Disease Paternal Grandfather         late 80's    Breast Cancer Maternal Aunt         late 50's    Asthma Sister     Seizure Disorder Sister         trauma induced    Hypertension Father     Asthma Sister     Genetic Disorder Mother         Colitis    Breast Cancer Other     Diabetes No family hx of     Coronary Artery Disease No family hx of     Hyperlipidemia No family hx of     Colon Cancer No family hx of     Depression No family hx of     Anxiety Disorder No family hx of     Thyroid Disease No family hx of     Genetic Disorder No family hx of            Breast Cancer Screening:    FHS-7:       4/4/2022     8:23 AM 5/1/2022    10:24 AM 1/2/2024     9:49 AM   Breast CA Risk Assessment (FHS-7)   Did any of your first-degree relatives have breast or ovarian cancer? No No No   Did any of your relatives have bilateral breast cancer? Unknown No No   Did any man in your family have breast cancer? Unknown No No   Did any woman in your family have breast and ovarian cancer? Yes Yes Yes   Did any woman in your family have breast cancer before age 50 y? Unknown No No   Do you have 2 or more relatives with breast and/or ovarian cancer? Yes Yes Yes   Do you have 2 or more relatives with breast and/or bowel cancer? No No Yes       Pertinent mammograms are reviewed under the imaging tab.    History of abnormal Pap smear: NO - age 30-65 PAP every 5 years with negative HPV co-testing recommended      Latest Ref Rng & Units 1/7/2019    11:24 AM 1/7/2019    11:17 AM 1/6/2016    12:00 AM   PAP / HPV   PAP (Historical)   NIL  NIL    HPV 16 DNA NEG^Negative Negative      HPV 18  "DNA NEG^Negative Negative      Other HR HPV NEG^Negative Negative        Reviewed and updated as needed this visit by clinical staff   Tobacco  Allergies  Meds              Reviewed and updated as needed this visit by Provider                 Past Medical History:   Diagnosis Date    Hypertension 10/30/20    Reason for visit      Past Surgical History:   Procedure Laterality Date    ENT SURGERY  1995    Tonsilectomy       Review of Systems   Constitutional:  Negative for chills and fever.   HENT:  Negative for congestion, ear pain, hearing loss and sore throat.    Eyes:  Negative for pain and visual disturbance.   Respiratory:  Negative for cough and shortness of breath.    Cardiovascular:  Positive for palpitations. Negative for chest pain and peripheral edema.   Gastrointestinal:  Negative for abdominal pain, constipation, diarrhea, heartburn, hematochezia and nausea.   Breasts:  Negative for tenderness, breast mass and discharge.   Genitourinary:  Negative for dysuria, frequency, genital sores, hematuria, pelvic pain, urgency, vaginal bleeding and vaginal discharge.   Musculoskeletal:  Negative for arthralgias, joint swelling and myalgias.   Skin:  Negative for rash.   Neurological:  Positive for headaches. Negative for dizziness, weakness and paresthesias.   Psychiatric/Behavioral:  Negative for mood changes. The patient is not nervous/anxious.           OBJECTIVE:   BP (!) 150/89 (BP Location: Left arm, Patient Position: Sitting, Cuff Size: Adult Regular)   Pulse 75   Temp 97.7  F (36.5  C) (Tympanic)   Resp 20   Ht 1.562 m (5' 1.5\")   Wt 68.9 kg (152 lb)   LMP 12/25/2023 (Approximate)   SpO2 98%   BMI 28.26 kg/m    Physical Exam  GENERAL: healthy, alert and no distress  EYES: Eyes grossly normal to inspection, PERRL and conjunctivae and sclerae normal  HENT: ear canals and TM's normal, nose and mouth without ulcers or lesions  NECK: no adenopathy, no asymmetry, masses, or scars and thyroid normal to " palpation  RESP: lungs clear to auscultation - no rales, rhonchi or wheezes  BREAST: normal without masses, tenderness or nipple discharge and no palpable axillary masses or adenopathy  CV: regular rate and rhythm, normal S1 S2, no S3 or S4, no murmur, click or rub, no peripheral edema and peripheral pulses strong  ABDOMEN: soft, nontender, no hepatosplenomegaly, no masses and bowel sounds normal   (female): normal female external genitalia, normal urethral meatus, vaginal mucosa pink, moist, well rugated, and normal cervix/adnexa/uterus without masses or discharge, pap/HPV obtained  MS: no gross musculoskeletal defects noted, no edema  SKIN: no suspicious lesions or rashes  NEURO: Normal strength and tone, mentation intact and speech normal  PSYCH: mentation appears normal, affect normal/bright  LYMPH: no cervical, supraclavicular, axillary, or inguinal adenopathy    Diagnostic Test Results:  Labs reviewed in Epic  No results found for this or any previous visit (from the past 24 hour(s)).    ASSESSMENT/PLAN:   (Z00.00) Routine general medical examination at a health care facility  (primary encounter diagnosis)  Comment:   Plan: REVIEW OF HEALTH MAINTENANCE PROTOCOL ORDERS,         PRIMARY CARE FOLLOW-UP SCHEDULING            (Z12.4) Cervical cancer screening  Comment:   Plan: Pap Screen with HPV - recommended age 30 - 65         years, Wet prep - Clinic Collect            (R03.0) Elevated BP without diagnosis of hypertension  Comment: low salt diet, reviewed DASH diet, continue to work on weigh tloss, regular exercise, check home BP's twice a week, call for BP persistently above 140/90. She endorses anxiety when coming to the office.  Plan: Basic metabolic panel  (Ca, Cl, CO2, Creat,         Gluc, K, Na, BUN), Albumin Random Urine         Quantitative with Creat Ratio            (Z23) Need for Tdap vaccination  Comment:   Plan: TDAP 10-64Y (ADACEL,BOOSTRIX)            Patient has been advised of split billing  "requirements and indicates understanding: Yes      COUNSELING:  Reviewed preventive health counseling, as reflected in patient instructions      BMI:   Estimated body mass index is 28.26 kg/m  as calculated from the following:    Height as of this encounter: 1.562 m (5' 1.5\").    Weight as of this encounter: 68.9 kg (152 lb).   Weight management plan: Discussed healthy diet and exercise guidelines      She reports that she has never smoked. She has never used smokeless tobacco.          TRINY Jiménez CNP  [facility]  "

## 2024-01-04 NOTE — PROGRESS NOTES
Prior to immunization administration, verified patients identity using patient s name and date of birth. Please see Immunization Activity for additional information.     Screening Questionnaire for Adult Immunization    Are you sick today?   No   Do you have allergies to medications, food, a vaccine component or latex?   No   Have you ever had a serious reaction after receiving a vaccination?   No   Do you have a long-term health problem with heart, lung, kidney, or metabolic disease (e.g., diabetes), asthma, a blood disorder, no spleen, complement component deficiency, a cochlear implant, or a spinal fluid leak?  Are you on long-term aspirin therapy?   No   Do you have cancer, leukemia, HIV/AIDS, or any other immune system problem?   No   Do you have a parent, brother, or sister with an immune system problem?   No   In the past 3 months, have you taken medications that affect  your immune system, such as prednisone, other steroids, or anticancer drugs; drugs for the treatment of rheumatoid arthritis, Crohn s disease, or psoriasis; or have you had radiation treatments?   No   Have you had a seizure, or a brain or other nervous system problem?   No   During the past year, have you received a transfusion of blood or blood    products, or been given immune (gamma) globulin or antiviral drug?   No   For women: Are you pregnant or is there a chance you could become       pregnant during the next month?   No   Have you received any vaccinations in the past 4 weeks?   No     Immunization questionnaire answers were all negative.      Patient instructed to remain in clinic for 15 minutes afterwards, and to report any adverse reactions.     Screening performed by Emerald Brown MA on 1/4/2024 at 8:38 AM.

## 2024-01-08 ENCOUNTER — OFFICE VISIT (OUTPATIENT)
Dept: FAMILY MEDICINE | Facility: CLINIC | Age: 39
End: 2024-01-08
Payer: COMMERCIAL

## 2024-01-08 VITALS
SYSTOLIC BLOOD PRESSURE: 147 MMHG | DIASTOLIC BLOOD PRESSURE: 81 MMHG | OXYGEN SATURATION: 98 % | HEART RATE: 76 BPM | TEMPERATURE: 99.4 F | BODY MASS INDEX: 28.16 KG/M2 | WEIGHT: 153 LBS | HEIGHT: 62 IN | RESPIRATION RATE: 18 BRPM

## 2024-01-08 DIAGNOSIS — R03.0 ELEVATED BP WITHOUT DIAGNOSIS OF HYPERTENSION: ICD-10-CM

## 2024-01-08 DIAGNOSIS — Z30.46 ENCOUNTER FOR REMOVAL AND REINSERTION OF NEXPLANON: Primary | ICD-10-CM

## 2024-01-08 LAB
BKR LAB AP GYN ADEQUACY: NORMAL
BKR LAB AP GYN INTERPRETATION: NORMAL
BKR LAB AP HPV REFLEX: NORMAL
BKR LAB AP LMP: NORMAL
BKR LAB AP PREVIOUS ABNORMAL: NORMAL
PATH REPORT.COMMENTS IMP SPEC: NORMAL
PATH REPORT.COMMENTS IMP SPEC: NORMAL
PATH REPORT.RELEVANT HX SPEC: NORMAL

## 2024-01-08 PROCEDURE — 99212 OFFICE O/P EST SF 10 MIN: CPT | Mod: 25 | Performed by: NURSE PRACTITIONER

## 2024-01-08 PROCEDURE — 11983 REMOVE/INSERT DRUG IMPLANT: CPT | Performed by: NURSE PRACTITIONER

## 2024-01-08 ASSESSMENT — PAIN SCALES - GENERAL: PAINLEVEL: NO PAIN (0)

## 2024-01-08 NOTE — PATIENT INSTRUCTIONS
At [facility], we strive to deliver an exceptional experience to you, every time we see you. If you receive a survey, please complete it as we do value your feedback.  If you have MyChart, you can expect to receive results automatically within 24 hours of their completion.  Your provider will send a note interpreting your results as well.   If you do not have MyChart, you should receive your results in about a week by mail.    Your care team:                            Family Medicine Internal Medicine   MD Hubert Medley, MD Alena Christianson, MD Nevaeh Solano, PASYED Jensen, MD Pediatrics   Tyson Woods, PASYED Ochoa, MD Comfort Real CNP   TRINY Gomez CNP, MD Marbella Yeung, NP coming October 2023 Same-Day (No follow up visit)    VLADISLAV Finn PA coming Oct 2023     Clinic hours: Monday - Thursday 7 am-6 pm; Fridays 7 am-5 pm.   Urgent care: Monday - Friday 10 am- 8 pm; Saturday and Sunday 9 am-5 pm.    Clinic: [phone]       [facility]: Monday - Thursday 8 am - 7 pm; Friday 8 am - 6 pm  [facility]: [phone]     We removed and replaced your Nexploanon today.    Call if bleeding, severe pain or foul smell.  Instructed to remove pressure dressing after 24 hours, then may keep insertion site covered with a bandaid until it is healed.  Instructed that she requires removal or replacement of the device in 3 years

## 2024-01-08 NOTE — PROGRESS NOTES
Assessment & Plan     Encounter for removal and reinsertion of Nexplanon  Procedure Note - Etonogestrel Implant Replacement    HPI: Gudelia Albarran is here for Nexplanon (etonogestrel implant) replacement  Indication: contraception  STI history: NONE      Counselling and Consent:  Affirmation of informed consent was signed and scanned into the medical record. Risks, benefits and alternatives were discussed.   Instructed on use of condoms for STI prevention.  Patient's questions were elicited and answered.        Preoperative Diagnosis:  Nexplanon Replacement  Postoperative Diagnosis:  same     Technique:   Skin prep Betadine  Anesthesia 1% lidocaine, with epi  EBL:   minimal  Complications: No  Tolerance:  Pt tolerated procedure well and was in stable condition.     Pt was positioned on exam table with left arm flexed and externally rotated. Nexplanon device was palpated without difficulty.  Area was prepped with betadine.  Anesthesia provided at the insertion site and along insertion track, Etonogestrel implant was then removed without difficulty using forceps.    Etonogestrel implant was then inserted subdermally in usual fashion. Provider and patient confirmed placement by palpating the device. Pressure dressing applied and procedure complete.     Follow up:  Pt was instructed to call if bleeding, severe pain or foul smell.  Instructed to remove pressure dressing after 24 hours, then may keep insertion site covered with a bandaid until it is healed.  Instructed that she requires removal or replacement of the device in 3 years.      Follow-up as needed.     NexplanonLOT # [id number], [id number]      Elevated BP without diagnosis of hypertension  Home BP is in normal range.  BP's tend to be elevated in clinic.  Continue to monitor home BP, call for BP > 140/90, continue with low salt diet, and weight loss efforts./regular exercise.      Prescription drug management  15 minutes spent by me on the date of the  "encounter doing chart review, history and exam, documentation and further activities per the note       Work on weight loss  Regular exercise  See Patient Instructions    TRINY Jiménez CNP  [facility]    Gaby Galeas is a 38 year old, presenting for the following health issues:  Nexplanon Removal & Placement        1/8/2024     3:25 PM   Additional Questions   Roomed by Harry   Accompanied by Self       HPI     Patient had Nexplanon placed 1/18/21 and is due for  removal/replacement today. She has tolerated this well and wishes to continue with this form of contraception.     BP elevated at last visit and today in clinic.  Home BP this am was 133/79 today.      Review of Systems   Constitutional, HEENT, cardiovascular, pulmonary, gi and gu systems are negative, except as otherwise noted.      Objective    BP (!) 138/90 (BP Location: Left arm, Patient Position: Chair, Cuff Size: Adult Large)   Pulse 76   Temp 99.4  F (37.4  C) (Tympanic)   Resp 18   Ht 1.562 m (5' 1.5\")   Wt 69.4 kg (153 lb)   LMP 12/25/2023 (Approximate)   SpO2 98%   BMI 28.44 kg/m    Body mass index is 28.44 kg/m .  Physical Exam   GENERAL: healthy, alert and no distress  EYES: Eyes grossly normal to inspection, PERRL and conjunctivae and sclerae normal  HENT: ear canals and TM's normal, nose and mouth without ulcers or lesions  NECK: no adenopathy, no asymmetry, masses, or scars and thyroid normal to palpation  RESP: lungs clear to auscultation - no rales, rhonchi or wheezes  CV: regular rate and rhythm, normal S1 S2, no S3 or S4, no murmur, click or rub, no peripheral edema and peripheral pulses strong  ABDOMEN: soft, nontender, no hepatosplenomegaly, no masses and bowel sounds normal  MS: no gross musculoskeletal defects noted, no edema  SKIN: no suspicious lesions or rashes  NEURO: Normal strength and tone, mentation intact and speech normal  PSYCH: mentation appears normal, affect " normal/bright  LYMPH: no cervical adenopathy

## 2024-01-09 LAB
HUMAN PAPILLOMA VIRUS 16 DNA: NEGATIVE
HUMAN PAPILLOMA VIRUS 18 DNA: NEGATIVE
HUMAN PAPILLOMA VIRUS FINAL DIAGNOSIS: NORMAL
HUMAN PAPILLOMA VIRUS OTHER HR: NEGATIVE

## 2024-05-14 NOTE — PATIENT INSTRUCTIONS

## 2024-11-18 ENCOUNTER — OFFICE VISIT (OUTPATIENT)
Dept: FAMILY MEDICINE | Facility: CLINIC | Age: 39
End: 2024-11-18
Payer: COMMERCIAL

## 2024-11-18 VITALS
HEIGHT: 61 IN | DIASTOLIC BLOOD PRESSURE: 82 MMHG | BODY MASS INDEX: 30.23 KG/M2 | WEIGHT: 160.13 LBS | OXYGEN SATURATION: 98 % | TEMPERATURE: 98.7 F | HEART RATE: 80 BPM | SYSTOLIC BLOOD PRESSURE: 148 MMHG

## 2024-11-18 DIAGNOSIS — R03.0 ELEVATED BP WITHOUT DIAGNOSIS OF HYPERTENSION: ICD-10-CM

## 2024-11-18 DIAGNOSIS — R00.2 PALPITATIONS: Primary | ICD-10-CM

## 2024-11-18 LAB
ERYTHROCYTE [DISTWIDTH] IN BLOOD BY AUTOMATED COUNT: 12.5 % (ref 10–15)
HCT VFR BLD AUTO: 40.8 % (ref 35–47)
HGB BLD-MCNC: 13.8 G/DL (ref 11.7–15.7)
MCH RBC QN AUTO: 27.8 PG (ref 26.5–33)
MCHC RBC AUTO-ENTMCNC: 33.8 G/DL (ref 31.5–36.5)
MCV RBC AUTO: 82 FL (ref 78–100)
PLATELET # BLD AUTO: 191 10E3/UL (ref 150–450)
RBC # BLD AUTO: 4.96 10E6/UL (ref 3.8–5.2)
WBC # BLD AUTO: 5.7 10E3/UL (ref 4–11)

## 2024-11-18 PROCEDURE — 36415 COLL VENOUS BLD VENIPUNCTURE: CPT

## 2024-11-18 PROCEDURE — 85027 COMPLETE CBC AUTOMATED: CPT

## 2024-11-18 ASSESSMENT — PAIN SCALES - GENERAL: PAINLEVEL_OUTOF10: NO PAIN (0)

## 2024-11-18 NOTE — PATIENT INSTRUCTIONS
At [facility], we strive to deliver an exceptional experience to you, every time we see you. If you receive a survey, please let us know what we are doing well and/or what we could improve upon, as we do value your feedback.  If you have MyChart, you can expect to receive results automatically within 24 hours of their completion.  Your provider will send a note interpreting your results as well.   If you do not have MyChart, you should receive your results in about a week by mail.    Your care team:                            Family Medicine Internal Medicine   MD Hubert Medley, MD Alena Christianson, MD Hermelindo Hennessy, MD Nevaeh Warner, PABhavaniC    Joseph Jensen, MD Pediatrics   Delaney Ochoa, MD Betina Whelan, MD Jeanine Oakley, APRN CNP Comfort Bolton APRN CNP   MD Ree Mc, MD Marbella Bradford, CNP     Russ Kaur, CNP Same-Day Provider (No follow-up visits)   TRINY Melissa, DNP Whitney Gutierrez, TRINY Cote, FNP, BC MARTHA DiamondC     Clinic hours: Monday - Thursday 7 am-6 pm; Fridays 7 am-5 pm.   Urgent care: Monday - Friday 10 am- 8 pm; Saturday and Sunday 9 am-5 pm.    Clinic: [phone]       [facility]: Monday - Thursday 8 am - 7 pm; Friday 8 am - 6 pm  [facility]: [phone]

## 2024-11-18 NOTE — PROGRESS NOTES
"  Assessment & Plan     Palpitations  - Reviewed possible causes for symptoms. Labs to evaluate for thyroid dysfunction, anemia, electrolyte disturbances. Zio monitor to evaluate for arrhythmias.  - ZIO PATCH 3-7 DAYS (additional cost to patient)  - ZIO PATCH 3-7 DAYS APPLICATION  - TSH with free T4 reflex  - CBC with platelets  - Basic metabolic panel  (Ca, Cl, CO2, Creat, Gluc, K, Na, BUN)    Elevated BP without diagnosis of hypertension  - Discussed that elevated BP may be causing symptoms. Check BP before and after exercise. Home BP at rest have been in normal range, elevated in clinic today.    BMI  Estimated body mass index is 30.26 kg/m  as calculated from the following:    Height as of this encounter: 1.549 m (5' 1\").    Weight as of this encounter: 72.6 kg (160 lb 2 oz).   Weight management plan: Discussed healthy diet and exercise guidelines    Return to care if symptoms worsen or fail to improve. Discussed return precautions in which she should be evaluated in the emergency department, including chest pain or symptoms that persist longer than a few minutes or are not relieved by rest.     Gaby Galeas is a 39 year old, presenting for the following health issues:  Palpitations (With physical exertion/)        11/18/2024    11:14 AM   Additional Questions   Roomed by Lila         11/18/2024    11:14 AM   Patient Reported Additional Medications   Patient reports taking the following new medications Nexplanon     History of Present Illness       Reason for visit:  Heat palpitations during exercise  Symptom onset:  More than a month  Symptoms include:  Heart pounding, light headedness, out of breath  Symptom intensity:  Mild  Symptom progression:  Staying the same  Had these symptoms before:  No  What makes it worse:  Continuing exercise  What makes it better:  Stopping the activity and drinking water   She is taking medications regularly.     Symptoms started over the summer, increased in frequency in " "the past few weeks.  Heart pounding, occasionally accompanied by dyspnea and dizziness. Feels like heart is beating irregularly, HR shoots up to 165-185, comes back down with rest.   Only happens during cardio workouts, not consistently.   Happens less frequently when she is hydrated, has helped to cut out caffeine.   History of atrial fibrillation in father.   Return to normal within a few minutes of stopping activity.  Home BP usually 125-135/75. History of hypertension in the family. On a low sodium diet.     Review of Systems  Constitutional, HEENT, cardiovascular, pulmonary, gi and gu systems are negative, except as otherwise noted.      Objective    BP (!) 155/79 (BP Location: Left arm, Patient Position: Sitting, Cuff Size: Adult Regular)   Pulse 80   Temp 98.7  F (37.1  C) (Temporal)   Ht 1.549 m (5' 1\")   Wt 72.6 kg (160 lb 2 oz)   SpO2 98%   BMI 30.26 kg/m    Body mass index is 30.26 kg/m .    Physical Exam   GENERAL: alert and no distress  NECK: no adenopathy, no asymmetry, masses, or scars  RESP: lungs clear to auscultation - no rales, rhonchi or wheezes  CV: regular rate and rhythm, normal S1 S2, no S3 or S4, no murmur, click or rub, no peripheral edema  MS: no gross musculoskeletal defects noted, no edema  SKIN: no suspicious lesions or rashes  NEURO: Normal strength and tone, mentation intact and speech normal  PSYCH: mentation appears normal, affect normal/bright      Signed Electronically by: TRINY Flores CNP    "

## 2024-11-19 LAB
ANION GAP SERPL CALCULATED.3IONS-SCNC: 14 MMOL/L (ref 7–15)
BUN SERPL-MCNC: 11.6 MG/DL (ref 6–20)
CALCIUM SERPL-MCNC: 10.1 MG/DL (ref 8.8–10.4)
CHLORIDE SERPL-SCNC: 106 MMOL/L (ref 98–107)
CREAT SERPL-MCNC: 0.74 MG/DL (ref 0.51–0.95)
EGFRCR SERPLBLD CKD-EPI 2021: >90 ML/MIN/1.73M2
GLUCOSE SERPL-MCNC: 104 MG/DL (ref 70–99)
HCO3 SERPL-SCNC: 23 MMOL/L (ref 22–29)
POTASSIUM SERPL-SCNC: 4 MMOL/L (ref 3.4–5.3)
SODIUM SERPL-SCNC: 143 MMOL/L (ref 135–145)
TSH SERPL DL<=0.005 MIU/L-ACNC: 3.19 UIU/ML (ref 0.3–4.2)

## 2024-11-24 ENCOUNTER — MYC MEDICAL ADVICE (OUTPATIENT)
Dept: FAMILY MEDICINE | Facility: CLINIC | Age: 39
End: 2024-11-24
Payer: COMMERCIAL

## 2024-12-09 LAB — CV ZIO PRELIM RESULTS: NORMAL

## 2024-12-16 ENCOUNTER — OFFICE VISIT (OUTPATIENT)
Dept: CARDIOLOGY | Facility: CLINIC | Age: 39
End: 2024-12-16
Payer: COMMERCIAL

## 2024-12-16 VITALS
OXYGEN SATURATION: 100 % | DIASTOLIC BLOOD PRESSURE: 104 MMHG | BODY MASS INDEX: 29.83 KG/M2 | SYSTOLIC BLOOD PRESSURE: 152 MMHG | HEART RATE: 69 BPM | HEIGHT: 61 IN | WEIGHT: 158 LBS

## 2024-12-16 DIAGNOSIS — R00.2 PALPITATIONS: Primary | ICD-10-CM

## 2024-12-16 RX ORDER — CETIRIZINE HYDROCHLORIDE 10 MG/1
10 TABLET ORAL DAILY
COMMUNITY

## 2024-12-16 NOTE — NURSING NOTE
"Chief Complaint   Patient presents with    New Patient     palpitations       Initial BP (!) 156/96 (BP Location: Right arm, Patient Position: Chair, Cuff Size: Adult Regular)   Pulse 69   Ht 1.549 m (5' 1\")   Wt 71.7 kg (158 lb)   SpO2 100%   BMI 29.85 kg/m   Estimated body mass index is 29.85 kg/m  as calculated from the following:    Height as of this encounter: 1.549 m (5' 1\").    Weight as of this encounter: 71.7 kg (158 lb)..  BP completed using cuff size: regular    Petrona CLINE CNA  "

## 2024-12-16 NOTE — PATIENT INSTRUCTIONS
Take your medicines every day, as directed     Changes made today:  Complete an echocardiogram  Let the clinic know if you would like to move forward with medication therapy as discussed today with Dr Lowe         Cardiology Care Coordinators:      Scarlett SOW RN     Cardiology Rooming Staff:  Petrona LARIOS EMT    Phone  [phone]      Fax [phone]    To Contact us     During Business Hours:  [phone]     If you are needing refills please contact your pharmacy.     For urgent after hour care please call the [facility] at [phone] or the [facility] at [phone] and ask to speak to the cardiologist on call.    If you are having a medical emergency, please call 911.            HOW TO CHECK YOUR BLOOD PRESSURE AT HOME:     Avoid eating, smoking, and exercising for at least 30 minutes before taking a reading.     Be sure you have taken your BP medication at least 2-3 hours before you check it.      Sit quietly for 10 minutes before a reading.      Sit in a chair with your feet flat on the floor. Rest your  arm on a table so that the arm cuff is at the same level as your heart.     Remain still during the reading.  Record your blood pressure and pulse in a log and bring to your next appointment.       Use [email or web address] allows you to communicate directly with your heart team through secure messaging.  [employer] can be accessed any time on your phone, computer, or tablet.  If you need assistance, we'd be happy to help!             Keep your Heart Appointments:     Follow-up to be determined

## 2024-12-16 NOTE — NURSING NOTE
Cardiac Testing:   -Complete an echocardiogram     Med Reconcile: Reviewed and verified all current medications with the patient. The updated medication list was printed and given to the patient./ No medication changes. Pt will let the clinic know if she wants to proceed with medication therapy discussed in clinic (See Dr Lowe's notes).     Return Appointment  -Follow-up to be determined     Patient stated she understood all health information given and agreed to call with further questions or concerns.

## 2024-12-16 NOTE — PROGRESS NOTES
"                                                                                                                                                                                               [facility]      REFERRING PROVIDER:  Ashlee Oakley APRN CNP    DATE OF VISIT:  December 16, 2024    REASON FOR VISIT:  Evaluation of exercise related palpitations    HISTORY OF PRESENT ILLNESS:   Ms. Gudelia Albarran is a 39 year old  female with past medical history significant for exercise related palpitations, \"whitecoat\" hypertension, and obesity.    She was initially seen by her primary care provider on 11/18/2024 with a 1 month history of heart palpitations during exercise.  Subsequent extended Holter monitoring using a Zio patch documented several episodes of self-limiting regular narrow QRS complex tachycardia at rates of up to 280 bpm.  She was referred to the cardiology clinic for evaluation and to discuss management recommendations.    The patient does endorse subjective awareness of palpitations with racing heart only during exercise.  This was first noted in the summer 2024 but has since getting more frequent and longer lasting.  Her exercise induced palpitations is usually associated with subjective awareness of pounding sensation, mild lightheadedness and mild shortness of breath without more severe symptoms.  Her symptomatic palpitations typically would in prove and then subside with cessation of exercise.  Her heart rate during palpitations typically is about 165 to 185 bpm.    She otherwise would consider herself physically active and healthy with no other significant symptoms referable to the cardiovascular system.  In particular, no prior history of chest pain, shortness of breath, or dyspnea on exertion except during palpitations.  No recent history of significant ankle edema, orthopnea, or paroxysmal nocturnal dyspnea.  No prior history of severe lightheadedness, syncope or " "near syncope.    Medications, personal, family, and social history reviewed.    PAST MEDICAL HISTORY:  Past Medical History:   Diagnosis Date    Whitecoat hypertension  10/30/20    Reason for visit     PAST SURGICAL HISTORY:  Past Surgical History:   Procedure Laterality Date    ENT SURGERY  1995    Tonsilectomy     CURRENT MEDICATIONS:  Current Outpatient Medications   Medication Sig Dispense Refill    cetirizine (ZYRTEC) 10 MG tablet Take 10 mg by mouth daily.      etonogestrel (NEXPLANON) 68 MG IMPL        ALLERGIES:  Allergies   Allergen Reactions    Propofol Nausea and Vomiting     FAMILY HISTORY:  Family History   Problem Relation Age of Onset    Genetic Disorder Mother         Colitis    Hypertension Father     Atrial fibrillation Father     Breast Cancer Maternal Grandmother     Hypertension Paternal Grandfather     Cerebrovascular Disease Paternal Grandfather         late 80's    Asthma Sister     Seizure Disorder Sister         trauma induced    Asthma Sister     Breast Cancer Maternal Aunt         late 50's    Diabetes No family hx of     Coronary Artery Disease No family hx of     Hyperlipidemia No family hx of     Colon Cancer No family hx of     Depression No family hx of     Anxiety Disorder No family hx of     Thyroid Disease No family hx of     Genetic Disorder No family hx of      SOCIAL HISTORY:  Tobacco Use    Smoking status: Never    Smokeless tobacco: Never   Vaping Use    Vaping status: Never Used   Substance Use Topics    Alcohol use: Yes     Comment: An occasional glass of wine with dinner    Drug use: No     REVIEW OF SYSTEMS:  Other than as stated under history of present illness and/or past medical history, comprehensive review of other systems was performed and was unremarkable.    PHYSICAL EXAMINATION:  BP (!) 152/104 (BP Location: Right arm, Patient Position: Sitting, Cuff Size: Adult Large)   Pulse 69   Ht 1.549 m (5' 1\")   Wt 71.7 kg (158 lb)   SpO2 100%   BMI 29.85 kg/m  "   GENERAL APPEARANCE: Moderately overweight but otherwise comfortable on room air with no evidence of central or peripheral cyanosis.  Alert and in no acute distress  HEENT: no icterus, no central cyanosis  LYMPH/NECK: no adenopathy, no asymmetry, no appreciable neck mass.  RESPIRATORY: lungs clear to auscultation - no rales, rhonchi or wheezes, no use of accessory muscles, no retractions, respirations are unlabored, normal respiratory rate  CARDIOVASCULAR: Auscultation reveals normal heart sounds.  There is a grade 1/6 systolic murmur at the apex.  There is no diastolic murmur.  GI: soft, non tender.  Abdomen is obese and difficult to evaluate for hepatosplenomegaly.  EXTREMITIES: No significant ankle edema.  NEURO: alert, normal speech,and affect  SKIN: no ecchymoses, no rashes    I have reviewed the labs and imaging results below and made my comment in the assessment and plan.    DIAGNOSTIC DATA:  CBC RESULTS:   Lab Results   Component Value Date    WBC 5.7 11/18/2024    RBC 4.96 11/18/2024    HGB 13.8 11/18/2024    HCT 40.8 11/18/2024    MCV 82 11/18/2024    MCH 27.8 11/18/2024    MCHC 33.8 11/18/2024    RDW 12.5 11/18/2024     11/18/2024     BMP RESULTS:  Lab Results   Component Value Date     11/18/2024     12/31/2020    POTASSIUM 4.0 11/18/2024    POTASSIUM 3.8 12/31/2020    CHLORIDE 106 11/18/2024    CHLORIDE 106 12/31/2020    CO2 23 11/18/2024    CO2 26 12/31/2020    ANIONGAP 14 11/18/2024    ANIONGAP 5 12/31/2020     (H) 11/18/2024     (H) 12/31/2020    BUN 11.6 11/18/2024    BUN 14 12/31/2020    CR 0.74 11/18/2024    CR 0.67 12/31/2020    GFRESTIMATED >90 11/18/2024    GFRESTIMATED >90 12/31/2020    GFRESTBLACK >90 12/31/2020    DANICA 10.1 11/18/2024    DANICA 9.5 12/31/2020      Electrocardiogram  Twelve-lead electrocardiogram obtained in the clinic on 12/16/2024 documented normal sinus rhythm at rate of 73 bpm.  There was normal W[place] interval of 140 ms.  There was normal QRS  duration of 68 ms.  QT and QTc intervals were measured 372 and 409 ms respectively.    Zio patch extended Holter  During extended Holter monitoring using a Zio patch from 11/18/2024 through 11/23/2024, her underlying rhythm was sinus with heart rate varying between 46 to 180 bpm with average heart rate of 82 bpm.  There were rare PAC with a burden of less than 1%.  There were rare PVC with a burden of less than 1%.  There was no ventricular tachycardia.  There were 72 episodes of narrow QRS complex tachycardia with the fastest episode at a rate of 279 bpm lasting for 47.8 seconds.  The longest episode lasted for 10 minutes and 3 seconds with an average rate of 236 bpm.  The 3 triggered events during the period of monitoring correlated with regular narrow QRS complex tachycardia with a cycle length of 230 ms, sinus tachycardia with a cycle length of 360 ms interposed with PAC, and narrow QRS complex tachycardia with a cycle length of 230 ms respectively.  There was no patient diary report of symptoms.    ASSESSMENT AND PLAN:   The differential diagnoses of the regular narrow QRS complex tachycardia as documented on the Zio patch would include the following-  A. Reentrant circuit type of supraventricular tachycardia such as AVNRT (atrioventricular gianluca reentry tachycardia), orthodromic reciprocating tachycardia utilizing a concealed accessory connection as the retrograde limb of the reentrant circuit tachycardia  B. Atrial tachycardia with automatic or triggered automaticity mechanism   C. Atrial flutter  Among the above-stated differential diagnoses, paroxysmal self-limiting atrial tachycardia of both automatic and triggered automaticity mechanism would appear to be more likely based on the following observations-  A. Onset of tachycardia preceded by a PAC or spontaneous sudden onset  B. Termination of tachycardia was quick but not abrupt and ending with ventricular depolarization with transitioning to sinus  tachycardia with gradual slowing.  Although the patient apparently only triggered the Zio patch 3 times out of the 72 episodes of paroxysmal atrial tachycardia, it would be conceivable that her symptoms were more noticeable only during the more prolonged episodes of tachycardia.  I first reassured her that atrial tachycardia in general is not life-threatening and therefore the need for therapeutic intervention would depend on subjective symptoms and or if there would be evidence of cardiac function compromise from high burden of arrhythmia-  A. I reviewed and discussed with her therapeutic intervention options in terms of medication versus intracardiac diagnostic electrophysiologic study aiming for possible radiofrequency catheter ablation of the arrhythmia substrates.  I also reviewed and discussed with her possible side effects of medication versus possible risks and complications associated with an interventional electrophysiology procedure.  In particular, I pointed out to her limitation of intracardiac diagnostic electrophysiologic study in that the clinical arrhythmia may not be readily inducible at the time of the procedure.  B. In terms of pharmacologic options, I pointed out to her that although beta-blocker typically is not very efficacious in atrial tachycardia suppression, it would not be unreasonable to begin with empiric trial of beta-blocker in the event that her atrial tachycardia could be precipitated by a heightened adrenergic state in which case beta-blockade to mitigate heightened adrenergic tone might be beneficial in lowering atrial tachycardia burden.  C. I further reviewed and discussed with her antiarrhythmic medication which would have better efficacy in rhythm suppression such as class Ic antiarrhythmic flecainide or propafenone which would be fairly effective in patients with structurally normal heart.  Other antiarrhythmic medication would include sotalol and amiodarone which may not be  "desirable for long-term use in young individuals.  Regarding her \"whitecoat hypertension\", the patient does endorse typical home monitor recording of blood pressure typically in the 120 mmHg systolic over 70 to 80 mmHg diastolic range.  She is cognizant of the importance of ongoing monitoring of her blood pressure at home to ensure no future development of true hypertension.  Finally, I also reviewed and discussed with her the utility of commercially available technology for self-monitoring of arrhythmia burden and to document symptom and arrhythmia correlation or lack thereof i.e [employer] smart phone application, CSDN or Apple Watch.  At the conclusion of the clinic encounter, the patient appeared to have a reasonable understanding of our discussion and stated no questions regarding the rationale for the above-stated management strategy recommendations.  She would like to give it more thoughts and considerations prior to deciding on her preferred management option.  Follow-up as below-  A. She will be scheduled for an echocardiogram in the near future to ensure a structurally normal heart with preserved cardiac function.  B. She will contact our office if she elects to proceed with empiric trial of medication i.e. metoprolol 25 to 50 mg about 20 minutes or so before exercise routine or longer acting Toprol XL 25 to 50 mg daily regular dosing  C. If she elects to consider empiric trial with class Ic antiarrhythmic after confirmation of a structurally normal heart with normal cardiac function on echocardiogram, flecainide can be started at 50 mg twice daily which can be increased in 25 mg twice daily increment up to maximum of 150 mg twice daily.  With the use of class Ic antiarrhythmic, a twelve-lead electrocardiogram should be repeated 5 to 7 days after initiation or with each escalated dosage to ensure no excessive QRS duration prolongation.  D. Follow-up in EP clinic in about 3 months, sooner if new " development.    Total time spent today for this visit is 40 minutes including precharting, face-to-face clinic visit, review of labs/imaging and medical documentation.    Sergey Lowe MD, FACC, FAHA, FHRS, CCDS  Cardiologist    (This medical documentation was transcribed using voice recognition technology and therefore will be susceptible to minor unintentional transcribing errors and/or omissions)

## 2024-12-18 LAB
ATRIAL RATE - MUSE: 73 BPM
DIASTOLIC BLOOD PRESSURE - MUSE: NORMAL MMHG
INTERPRETATION ECG - MUSE: NORMAL
P AXIS - MUSE: 33 DEGREES
PR INTERVAL - MUSE: 140 MS
QRS DURATION - MUSE: 68 MS
QT - MUSE: 372 MS
QTC - MUSE: 409 MS
R AXIS - MUSE: 45 DEGREES
SYSTOLIC BLOOD PRESSURE - MUSE: NORMAL MMHG
T AXIS - MUSE: 38 DEGREES
VENTRICULAR RATE- MUSE: 73 BPM

## 2025-01-10 ENCOUNTER — ANCILLARY PROCEDURE (OUTPATIENT)
Dept: CARDIOLOGY | Facility: CLINIC | Age: 40
End: 2025-01-10
Attending: INTERNAL MEDICINE
Payer: COMMERCIAL

## 2025-01-10 DIAGNOSIS — R00.2 PALPITATIONS: ICD-10-CM

## 2025-01-10 LAB — LVEF ECHO: NORMAL

## 2025-01-10 PROCEDURE — 93306 TTE W/DOPPLER COMPLETE: CPT | Performed by: INTERNAL MEDICINE

## 2025-01-12 SDOH — HEALTH STABILITY: PHYSICAL HEALTH: ON AVERAGE, HOW MANY MINUTES DO YOU ENGAGE IN EXERCISE AT THIS LEVEL?: 40 MIN

## 2025-01-12 SDOH — HEALTH STABILITY: PHYSICAL HEALTH: ON AVERAGE, HOW MANY DAYS PER WEEK DO YOU ENGAGE IN MODERATE TO STRENUOUS EXERCISE (LIKE A BRISK WALK)?: 5 DAYS

## 2025-01-12 ASSESSMENT — SOCIAL DETERMINANTS OF HEALTH (SDOH): HOW OFTEN DO YOU GET TOGETHER WITH FRIENDS OR RELATIVES?: TWICE A WEEK

## 2025-01-13 ENCOUNTER — OFFICE VISIT (OUTPATIENT)
Dept: FAMILY MEDICINE | Facility: CLINIC | Age: 40
End: 2025-01-13
Attending: NURSE PRACTITIONER
Payer: COMMERCIAL

## 2025-01-13 VITALS
RESPIRATION RATE: 16 BRPM | OXYGEN SATURATION: 97 % | HEART RATE: 72 BPM | WEIGHT: 163.4 LBS | SYSTOLIC BLOOD PRESSURE: 133 MMHG | HEIGHT: 61 IN | BODY MASS INDEX: 30.85 KG/M2 | TEMPERATURE: 98 F | DIASTOLIC BLOOD PRESSURE: 84 MMHG

## 2025-01-13 DIAGNOSIS — R03.0 ELEVATED BP WITHOUT DIAGNOSIS OF HYPERTENSION: ICD-10-CM

## 2025-01-13 DIAGNOSIS — Z00.00 ROUTINE HISTORY AND PHYSICAL EXAMINATION OF ADULT: Primary | ICD-10-CM

## 2025-01-13 DIAGNOSIS — R00.2 PALPITATIONS: ICD-10-CM

## 2025-01-13 DIAGNOSIS — Z97.5 NEXPLANON IN PLACE: ICD-10-CM

## 2025-01-13 PROCEDURE — 99395 PREV VISIT EST AGE 18-39: CPT | Performed by: NURSE PRACTITIONER

## 2025-01-13 NOTE — PROGRESS NOTES
Preventive Care Visit  [facility]  TRINY Jiménez CNP, Family Medicine  Jan 13, 2025      Assessment & Plan     Routine history and physical examination of adult  Appropriate preventive services were addressed with this patient via screening, questionnaire, or discussion as appropriate for fall prevention, nutrition, physical activity, tobacco-use cessation, social engagement, weight loss and cognition.  Checklist reviewing preventive services available has been given to the patient.        Palpitations  Followed by Cardiology a nd had normal echocardiogram 1/10/25.  She is drinking more water and her symptoms are improving    Elevated BP without diagnosis of hypertension  Recheck BP was 133/84, continue to monitor the salt in the diet    Nexplanon in place  Placed 1/2024/            Counseling  Appropriate preventive services were addressed with this patient via screening, questionnaire, or discussion as appropriate for fall prevention, nutrition, physical activity, Tobacco-use cessation, social engagement, weight loss and cognition.  Checklist reviewing preventive services available has been given to the patient.  Reviewed patient's diet, addressing concerns and/or questions.       Work on weight loss  Regular exercise  See Patient Instructions    Gaby Galeas is a 39 year old, presenting for the following:  Physical        1/13/2025     4:16 PM   Additional Questions   Roomed by Chelsie   Accompanied by self         1/13/2025     4:16 PM   Patient Reported Additional Medications   Patient reports taking the following new medications No          HPI    She has been checking home BP's  [phone]. She saw Cardiology in Dec 2024 for palpitations, intermittent/dizziness with exercise.  She had normal echo as below:  PACS Images     Show images for Echocardiogram Complete  Echocardiogram Complete  Order: [id number]  Status: Edited Result - FINAL       Visible to patient: Yes  (seen)       Next appt: None       Dx: Palpitations    0 Result Notes  Details    Reading Physician Reading Date Result Priority   Chiquita Cheatham MD  [phone] 1/10/2025      Result Text  [id number]  [id number]  [id number]  [id number]^[email or web address]^MICHELLE^HOLLIS     [facility]  Echocardiography Laboratory  [address] [place] [address]     Name: JOVANNA GARZA  MRN: [id number]  : 1985  Study Date: 01/10/2025 02:22 PM  Age: 39 yrs  Gender: Female  Patient Location: [facility]  Reason For Study: Palpitations  Ordering Physician: MICHELLE COOK  Referring Physician: MICHELLE COOK  Performed By: Jolanta Tomas RDCS     BSA: 1.7 m2  Height: 61 in  Weight: 152 lb  BP: 142/88 mmHg  ______________________________________________________________________________  Procedure  Echocardiogram with two-dimensional, color and spectral Doppler.  ______________________________________________________________________________  Interpretation Summary     Global and regional left ventricular function is normal with an EF of 60-65%.  Global right ventricular function is normal.  Pulmonary artery systolic pressure is normal.  No significant valvular abnormalities present.  Estimated mean right atrial pressure is normal.  No pericardial effusion is present.  ______________________________________________________________________________  Left Ventricle  Left ventricular size is normal. Left ventricular wall thickness is normal.  Global and regional left ventricular function is normal with an EF of 60-65%.  No regional wall motion abnormalities are seen.     Right Ventricle  The right ventricle is normal size. Global right ventricular function is  normal.     Atria  Both atria appear normal. The atrial septum is intact as assessed by color  Doppler .     Mitral Valve  The mitral valve is normal. Trace mitral insufficiency is present.     Aortic Valve  Aortic valve is normal in structure and function.     Tricuspid  Valve  The tricuspid valve is normal. Trace tricuspid insufficiency is present. The  right ventricular systolic pressure is approximated at 20.1 mmHg plus the  right atrial pressure. Pulmonary artery systolic pressure is normal.     Pulmonic Valve  The pulmonic valve is normal.     Vessels  The aorta root is normal. The inferior vena cava is normal. Estimated mean  right atrial pressure is normal.     Pericardium  No pericardial effusion is present.     Miscellaneous  No significant valvular abnormalities present.     Compared to Previous Study  Previous study not available for comparison.  ______________________________________________________________________________  MMode/2D Measurements & Calculations  IVSd: 0.82 cm  LVIDd: 3.9 cm  LVIDs: 2.3 cm  LVPWd: 0.74 cm  FS: 39.9 %  LV mass(C)d: 85.7 grams  LV mass(C)dI: 51.0 grams/m2  Ao root diam: 3.3 cm  asc Aorta Diam: 3.1 cm  LVOT diam: 1.8 cm  LVOT area: 2.5 cm2  Ao root diam index Ht(cm/m): 2.1  Ao root diam index BSA (cm/m2): 2.0  Asc Ao diam index BSA (cm/m2): 1.8  Asc Ao diam index Ht(cm/m): 2.0  LA Volume (BP): 48.0 ml     LA Volume Index (BP): 28.6 ml/m2  RWT: 0.38  TAPSE: 2.4 cm     Doppler Measurements & Calculations  MV E max kamran: 103.0 cm/sec  MV A max kamran: 67.7 cm/sec  MV E/A: 1.5  MV dec time: 0.15 sec  Ao V2 max: 165.0 cm/sec  Ao max PG: 10.9 mmHg  TR max kamran: 224.0 cm/sec  TR max P.1 mmHg  E/E' av.5  Lateral E/e': 6.1     Medial E/e': 9.0  RV S Kamran: 13.8 cm/sec     ______________________________________________________________________________  Report approved by: Chiquita Cheatham MD on 01/10/2025 04:03 PM         She is drinking more water and her symptoms seem to be improvingt somewhat.      Health Care Directive  Patient does not have a Health Care Directive: Discussed advance care planning with patient; information given to patient to review.      2025   General Health   How would you rate your overall physical health? Excellent   Feel  stress (tense, anxious, or unable to sleep) Only a little   (!) STRESS CONCERN      1/12/2025   Nutrition   Three or more servings of calcium each day? (!) I DON'T KNOW   Diet: Low salt   How many servings of fruit and vegetables per day? (!) 2-3   How many sweetened beverages each day? 0-1         1/12/2025   Exercise   Days per week of moderate/strenous exercise 5 days   Average minutes spent exercising at this level 40 min         1/12/2025   Social Factors   Frequency of gathering with friends or relatives Twice a week   Worry food won't last until get money to buy more No   Food not last or not have enough money for food? No   Do you have housing? (Housing is defined as stable permanent housing and does not include staying ouside in a car, in a tent, in an abandoned building, in an overnight shelter, or couch-surfing.) Yes   Are you worried about losing your housing? No   Lack of transportation? No   Unable to get utilities (heat,electricity)? No         1/12/2025   Dental   Dentist two times every year? Yes         1/12/2025   TB Screening   Were you born outside of the [place]? No         Today's PHQ-2 Score:       1/12/2025     5:39 PM   PHQ-2 ( 1999 [employer])   Q1: Little interest or pleasure in doing things 0   Q2: Feeling down, depressed or hopeless 0   PHQ-2 Score 0    Q1: Little interest or pleasure in doing things Not at all   Q2: Feeling down, depressed or hopeless Not at all   PHQ-2 Score 0       Patient-reported           1/12/2025   Substance Use   Alcohol more than 3/day or more than 7/wk No   Do you use any other substances recreationally? No     Social History     Tobacco Use    Smoking status: Never    Smokeless tobacco: Never   Vaping Use    Vaping status: Never Used   Substance Use Topics    Alcohol use: Yes     Comment: An occasional glass of wine with dinner    Drug use: No           1/2/2024   LAST FHS-7 RESULTS   1st degree relative breast or ovarian cancer No   Any relative bilateral breast  "cancer No   Any male have breast cancer No   Any ONE woman have BOTH breast AND ovarian cancer Yes   Any woman with breast cancer before 50yrs No   2 or more relatives with breast AND/OR ovarian cancer Yes   2 or more relatives with breast AND/OR bowel cancer Yes        Mammogram Screening - Patient under 40 years of age: Routine Mammogram Screening not recommended.         1/12/2025   STI Screening   New sexual partner(s) since last STI/HIV test? No     History of abnormal Pap smear: No - age 30-64 HPV with reflex Pap every 5 years recommended        Latest Ref Rng & Units 1/4/2024     7:56 AM 1/7/2019    11:24 AM 1/7/2019    11:17 AM   PAP / HPV   PAP  Negative for Intraepithelial Lesion or Malignancy (NILM)      PAP (Historical)    NIL    HPV 16 DNA Negative Negative  Negative     HPV 18 DNA Negative Negative  Negative     Other HR HPV Negative Negative  Negative             1/12/2025   Contraception/Family Planning   Questions about contraception or family planning No       Reviewed and updated as needed this visit by Provider     Meds                Past Medical History:   Diagnosis Date    Hypertension 10/30/20    Reason for visit     Past Surgical History:   Procedure Laterality Date    ENT SURGERY  1995    Tonsilectomy         Review of Systems  Constitutional, HEENT, cardiovascular, pulmonary, gi and gu systems are negative, except as otherwise noted.     Objective    Exam  /84 (BP Location: Left arm, Patient Position: Sitting, Cuff Size: Adult Regular)   Pulse 72   Temp 98  F (36.7  C) (Temporal)   Resp 16   Ht 1.549 m (5' 1\")   Wt 74.1 kg (163 lb 6.4 oz)   SpO2 97%   BMI 30.87 kg/m     Estimated body mass index is 30.87 kg/m  as calculated from the following:    Height as of this encounter: 1.549 m (5' 1\").    Weight as of this encounter: 74.1 kg (163 lb 6.4 oz).    Physical Exam  GENERAL: alert and no distress  EYES: Eyes grossly normal to inspection, PERRL and conjunctivae and sclerae " normal  HENT: ear canals and TM's normal, nose and mouth without ulcers or lesions  NECK: no adenopathy, no asymmetry, masses, or scars  RESP: lungs clear to auscultation - no rales, rhonchi or wheezes  CV: regular rate and rhythm, normal S1 S2, no S3 or S4, no murmur, click or rub, no peripheral edema  ABDOMEN: soft, nontender, no hepatosplenomegaly, no masses and bowel sounds normal  MS: no gross musculoskeletal defects noted, no edema  SKIN: no suspicious lesions or rashes  NEURO: Normal strength and tone, mentation intact and speech normal  PSYCH: mentation appears normal, affect normal/bright  LYMPH: normal ant/post cervical, supraclavicular nodes  inguinal: no adenopathy        Signed Electronically by: TRINY Jiménez CNP

## 2025-07-18 DIAGNOSIS — R00.2 PALPITATIONS: ICD-10-CM

## 2025-07-21 RX ORDER — METOPROLOL SUCCINATE 50 MG/1
50 TABLET, EXTENDED RELEASE ORAL DAILY
Qty: 90 TABLET | Refills: 1 | Status: SHIPPED | OUTPATIENT
Start: 2025-07-21

## 2025-07-21 NOTE — TELEPHONE ENCOUNTER
Last Written Prescription:   Disp Refills Start End NILES   metoprolol succinate ER (TOPROL XL) 50 MG 24 hr tablet 90 tablet 1 1/24/2025 -- No   Sig - Route: Take 1 tablet (50 mg) by mouth daily. - Oral     ----------------------  Last Visit Date:   12/16/2024  [facility]    Future Visit Date:    7/28/2025 4:30 PM (30 min)  [facility]   Arrive by:  4:15 PM   RETURN CARDIOLOGY   Rfl Status: Pending Review   [facility] ([facility])   Donta Castellanos MD     ----------------------  Required Labs/Test Results:   BP Readings from Last 3 Encounters:   01/13/25 133/84   12/16/24 (!) 152/104   11/18/24 (!) 148/82       Refill decision:       Requested Prescriptions   Pending Prescriptions Disp Refills    metoprolol succinate ER (TOPROL XL) 50 MG 24 hr tablet [Pharmacy Med Name: METOPROLOL SUCC ER 50 MG TAB] 90 tablet 1     Sig: TAKE 1 TABLET BY MOUTH EVERY DAY       Beta-Blockers Protocol Passed - 7/21/2025 10:28 AM        Passed - Patient is age 6 or older        Passed - Medication is active on med list and the sig matches. RN to manually verify dose and sig if red X/fail.     If the protocol passes (green check), you do not need to verify med dose and sig.    A prescription matches if they are the same clinical intention.    For Example: once daily and every morning are the same.    The protocol can not identify upper and lower case letters as matching and will fail.     For Example: Take 1 tablet (50 mg) by mouth daily     TAKE 1 TABLET (50 MG) BY MOUTH DAILY    For all fails (red x), verify dose and sig.    If the refill does match what is on file, the RN can still proceed to approve the refill request.       If they do not match, route to the appropriate provider.             Passed - Medication indicated for associated diagnosis     Medication is associated with one or more of the following diagnoses:     Hypertension (HTN)   Atrial fibrillation/flutter   Angina   ASCVD   Migraine   Heart  Failure   Tremor   Anxiety   Ocular hypertension   Glaucoma   PTSD   Obstructive hypertrophic cardiomyopathy   History of myocarditis   Palpitations   POTS (postural orthostatic tachycardia syndrome)   SVT (supraventricular tachycardia)   Hyperthyroidism   Tachycardia   Acute non-st segment elevation myocardial infarction   Subsequent non-st segment elevation myocardial infarction   Ischemic myocardial dysfunction          Passed - Most recent blood pressure on file in last 12 months     BP Readings from Last 3 Encounters:   01/13/25 133/84   12/16/24 (!) 152/104   11/18/24 (!) 148/82       No data recorded            Passed - Recent (12 month) or future (90 days) visit with authorizing provider's specialty (provided they have been seen in the past 15 months)     The patient must have completed an in-person or virtual visit within the past 12 months or has a future visit scheduled within the next 90 days with the authorizing provider s specialty.  Urgent care and e-visits do not qualify as an office visit for this protocol.

## 2025-07-27 NOTE — PROGRESS NOTES
[facility]           Referring provider:Sergey Lowe MD     HPI: Ms. Gudelia Albarran is a 40 year old  female with PMH significant for    Hypertension  Obesity  SVT    Patient was seen in cardiology clinic 12/16/2020 for for 1 month history of palpitations during exercise.  Subsequent Holter monitor with Zio patch showed several episodes of narrow QRS tachycardia at rates of 280 bpm.  Longest lasting 10 minutes.  Some episodes of SVT may be possible atrial tachycardia.  SVT was detected within 45 seconds of symptomatic patient events.  She was referred to cardiology.    Today patient reports  - Onset of palpitations and episodes of rapid heart rate less than 1 year ago, never experienced prior  - Episodes triggered exclusively by physical exertion, especially during cardio workouts, heat, dehydration, or insufficient food intake  - Frequency of episodes approximately once every 6-8 workouts; typically exercises 2-3 times per week  - Symptoms never occur at rest; only with exertion or at high altitude with exertion (e.g., walking at elevation in [place]), but never full episode at rest  - Episodes characterized by sudden onset of heart racing, sometimes preceded by warning sensation; if stopped early, symptoms resolve quickly, usually in less than 1 minute  - If not stopped early, episodes last up to 2-3 minutes, associated with dizziness and need to sit down  - Occasional mild discomfort (not pain) during some episodes, no longer present since starting metoprolol  - No dizziness since starting metoprolol  - Able to trigger episodes by exercising while dehydrated  - No history of similar symptoms in previous years  - Initial management with metoprolol started approximately 5-6 months ago, initially as needed, then changed to daily after echocardiogram  - Noted increased fatigue and reduced exercise  tolerance after starting metoprolol, improved with time  - Symptoms less frequent and less severe on metoprolol, but still present  - No history of stress test prior to this encounter  - Home blood pressure monitoring performed in the past, but not recently; blood pressure consistently high in clinic  - No current use of wearable heart rate monitor (Fitbit previously used, now broken)  - No alcohol use except occasional glass of wine a couple times per month; actively reducing caffeine intake (drinks tea, no coffee)    Echocardiogram earlier this year is unremarkable.     Medications, personal, family, and social history reviewed with patient and revised.    PAST MEDICAL HISTORY:  Past Medical History:   Diagnosis Date    Hypertension 10/30/20    Reason for visit       CURRENT MEDICATIONS:  Current Outpatient Medications   Medication Sig Dispense Refill    etonogestrel (NEXPLANON) 68 MG IMPL       metoprolol succinate ER (TOPROL XL) 50 MG 24 hr tablet Take 1 tablet (50 mg) by mouth daily. 90 tablet 1       PAST SURGICAL HISTORY:  Past Surgical History:   Procedure Laterality Date    ENT SURGERY  1995    Tonsilectomy       ALLERGIES:     Allergies   Allergen Reactions    Propofol Nausea and Vomiting       FAMILY HISTORY:  Family History   Problem Relation Age of Onset    Genetic Disorder Mother         Colitis    Hypertension Father     Atrial fibrillation Father     Breast Cancer Maternal Grandmother     Hypertension Paternal Grandfather     Cerebrovascular Disease Paternal Grandfather         late 80's    Asthma Sister     Seizure Disorder Sister         trauma induced    Asthma Sister     Breast Cancer Maternal Aunt         late 50's    Diabetes No family hx of     Coronary Artery Disease No family hx of     Hyperlipidemia No family hx of     Colon Cancer No family hx of     Depression No family hx of     Anxiety Disorder No family hx of     Thyroid Disease No family hx of     Genetic Disorder No family hx of           SOCIAL HISTORY:  Social History     Tobacco Use    Smoking status: Never    Smokeless tobacco: Never   Vaping Use    Vaping status: Never Used   Substance Use Topics    Alcohol use: Yes     Comment: An occasional glass of wine with dinner    Drug use: No       ROS:   Constitutional: No fever, chills, or sweats. Weight stable.   Cardiovascular: As per HPI.       Exam:  There were no vitals taken for this visit.  GENERAL APPEARANCE: alert and no distress  HEENT: no icterus, no central cyanosis  LYMPH/NECK: no adenopathy, no asymmetry, JVP not elevated, no carotid bruits.  RESPIRATORY: lungs clear to auscultation - no rales, rhonchi or wheezes, no use of accessory muscles, no retractions, respirations are unlabored, normal respiratory rate  CARDIOVASCULAR: regular rhythm, normal S1, S2, no S3 or S4 and no murmur, click or rub, precordium quiet with normal PMI.  GI: soft, non tender  EXTREMITIES: peripheral pulses normal, no edema  NEURO: alert, normal speech,and affect  SKIN: no ecchymoses, no rashes     I have reviewed the labs and personally reviewed the imaging below and made my comment in the assessment and plan.    Labs:  CBC RESULTS:   Lab Results   Component Value Date    WBC 5.7 11/18/2024    RBC 4.96 11/18/2024    HGB 13.8 11/18/2024    HCT 40.8 11/18/2024    MCV 82 11/18/2024    MCH 27.8 11/18/2024    MCHC 33.8 11/18/2024    RDW 12.5 11/18/2024     11/18/2024       BMP RESULTS:  Lab Results   Component Value Date     11/18/2024     12/31/2020    POTASSIUM 4.0 11/18/2024    POTASSIUM 3.8 12/31/2020    CHLORIDE 106 11/18/2024    CHLORIDE 106 12/31/2020    CO2 23 11/18/2024    CO2 26 12/31/2020    ANIONGAP 14 11/18/2024    ANIONGAP 5 12/31/2020     (H) 11/18/2024     (H) 12/31/2020    BUN 11.6 11/18/2024    BUN 14 12/31/2020    CR 0.74 11/18/2024    CR 0.67 12/31/2020    GFRESTIMATED >90 11/18/2024    GFRESTIMATED >90 12/31/2020    GFRESTBLACK >90 12/31/2020    DANICA 10.1  11/18/2024    DANICA 9.5 12/31/2020      Echocardiogram 1/10/2025  Global and regional left ventricular function is normal with an EF of 60-65%.  Global right ventricular function is normal.  Pulmonary artery systolic pressure is normal.  No significant valvular abnormalities present.  Estimated mean right atrial pressure is normal.  No pericardial effusion is present.    Zio patch extended Holter  During extended Holter monitoring using a Zio patch from 11/18/2024 through 11/23/2024, her underlying rhythm was sinus with heart rate varying between 46 to 180 bpm with average heart rate of 82 bpm.  There were rare PAC with a burden of less than 1%.  There were rare PVC with a burden of less than 1%.  There was no ventricular tachycardia.  There were 72 episodes of narrow QRS complex tachycardia with the fastest episode at a rate of 279 bpm lasting for 47.8 seconds.  The longest episode lasted for 10 minutes and 3 seconds with an average rate of 236 bpm.  The 3 triggered events during the period of monitoring correlated with regular narrow QRS complex tachycardia with a cycle length of 230 ms, sinus tachycardia with a cycle length of 360 ms interposed with PAC, and narrow QRS complex tachycardia with a cycle length of 230 ms respectively.  There was no patient diary report of symptoms.             Assessment and Plan:   1. Supraventricular tachycardia (SVT), exercise-induced only     - SVT is present, characterized by episodes of abnormal heart rhythms originating from the top chambers of the heart. These episodes are not life-threatening but can cause symptoms such as dizziness and lightheadedness.     - Echocardiogram and baseline EKG EKG results are normal, with no concerning findings.     - SVT episodes are triggered by physical exertion, dehydration, and inadequate nutrition, with heart rates reaching up to 280 beats per minute and lasting up to 10 minutes.     - Current management with metoprolol has reduced the  frequency and severity of symptoms, but episodes persist.     - Consideration of ablation as a first-line treatment for SVT, with potential for cure and elimination of the need for lifelong medication.    2.  Diastolic hypertension     - Blood pressure has been consistently high in clinic and at home in the past.     - Diltiazem may provide additional benefit for blood pressure control.    Plan:  - Initiate treatment with diltiazem 120 mg daily as an alternative to metoprolol.  Stop metoprolol.  This medication may help manage SVT without causing fatigue. Start with a low dose and adjust as needed based on response and side effects.  - Schedule an SVT ablation procedure to potentially cure the arrhythmia. This procedure involves mapping the heart to locate the source of the SVT and using radiofrequency energy to eliminate it. The patient expressed interest in scheduling this for later in the fall after travel plans.  - Prior to the ablation, discontinue all medications and refrain from exercise for one week to allow for a washout period. This is necessary to ensure accurate mapping during the procedure.    Return to clinic 6 months    Esela ALEYDA documentation tool used in the creation of this note.     Total time spent today for this visit is 54 minutes including precharting, face-to-face clinic visit, review of labs/imaging and medical documentation.    Donta WOODRUFF MD  [facility] Division of Cardiology    Securely message with [employer]

## 2025-07-28 ENCOUNTER — OFFICE VISIT (OUTPATIENT)
Dept: CARDIOLOGY | Facility: CLINIC | Age: 40
End: 2025-07-28
Payer: COMMERCIAL

## 2025-07-28 VITALS
HEART RATE: 66 BPM | BODY MASS INDEX: 30.99 KG/M2 | OXYGEN SATURATION: 96 % | WEIGHT: 164 LBS | SYSTOLIC BLOOD PRESSURE: 129 MMHG | DIASTOLIC BLOOD PRESSURE: 89 MMHG

## 2025-07-28 DIAGNOSIS — R00.2 PALPITATIONS: ICD-10-CM

## 2025-07-28 DIAGNOSIS — I47.10 SVT (SUPRAVENTRICULAR TACHYCARDIA): Primary | ICD-10-CM

## 2025-07-28 PROCEDURE — 99215 OFFICE O/P EST HI 40 MIN: CPT | Performed by: INTERNAL MEDICINE

## 2025-07-28 RX ORDER — DILTIAZEM HYDROCHLORIDE 120 MG/1
120 CAPSULE, COATED, EXTENDED RELEASE ORAL DAILY
Qty: 90 CAPSULE | Refills: 3 | Status: SHIPPED | OUTPATIENT
Start: 2025-07-28

## 2025-07-28 NOTE — PATIENT INSTRUCTIONS
Thank you for coming to the [facility]; please note the following instructions:    1. Stop Metoprolol    2. Start Diltiazem 120 mg daily    3. SVT Ablation with Dr. Salazar     You are scheduled for an Supraventricular Tachycardia (SVT) ablation, at [facility]. The hospital is located at [address] [place] [address] on the [place] bank of the [place].  If you need to cancel this procedure, please call [phone].       Visitor Policy: Two visitors.    Date:______  Time: _______________To the Gold Waiting Room at the [facility]  SVT Ablation    1. Your history and physical will be completed by our advanced practice provider when you arrive.  2. Please do not eat anything for 8 hours prior to your procedure. You may have sips of water up until 2 hours prior to your arrival.  3. Medications to continue:  - Anticoagulant (__): if you take a dose in the morning, please take it before you arrive.  - Take all meds as prescribed, except for those noted below.  4. Medications to hold:    - Morning of: diuretic, oral diabetic meds, [ertugliflozin (Steglatro), canaglilozin (Invokana), dapagliflozin (Farxiga), empagliflozin (Jardiance)], short acting insulin, mixed insulin: take 66% of NPH.  - Day prior: Take 80% of long acting insulin.  - Day prior & day of, if daily or BID dosing: [liraglutide (Victoza, Saxenda), exenatide (Byetta), insulin glargine/lixisenatide (Soliqua)].   - 1 week prior, if weekly dosing: [semaglutide (Rybelsus, Ozempic, Wegovy), dulaglutide (Trulicity), exenatide ER (Bydureon), tirzepatide (Mounjaro)].  5. You may discharge the same day. You will need a .        Post-Procedure Instructions  Care of groin site:   Remove the Band-Aid after 24 hours. If there is minor oozing, apply another Band-aid and remove it after 12 hours.    Do NOT take a bath, use a hot tub, pool, or submerse in water for at least 3 days. You may shower.    It  is normal to have a small bruise or lump at the site.   Do not scrub the site.   Do not use lotion or powder near the puncture site for 3 days.    If you start bleeding from the site in your groin: Lie down flat and press firmly on the site. Call your physician immediately, or, come to the emergency room.  Call 911 right away if you have bleeding that is heavy or does not stop.    Call your doctor/provider if:    You have a large or growing hard lump around the site.    The site is red, swollen, hot or tender.    You have chills or a fever greater than 101 F (38 C).    Blood or fluid is draining from the site.    Your leg or arm turns bluish, feels numb or cool.    You have hives, a rash or unusual itching.      Activity Restrictions   For the first 2 days: Do not stoop or squat. When you cough, sneeze or move your bowels, hold your hand over the puncture site and press gently.   Do not lift more than 10 pounds or exertional activity for 10 days.  - No driving for 24 hours after (with or without general anesthesia).       Date: _______ Follow up appointment      Please do not hesitate to utilize [employer] or call us if you have any questions or concerns.    Scarlett GARSIA RN   Nurse Coordinator  [phone]    Stephany BARAJAS Procedure   [phone]     4. Follow up with Dr. Castellanos in 6 months    If you have any questions regarding your visit please contact your care team:     Cardiology  Telephone Number   Maria Ines RAY., RN  Scarlett CHOI, RN  Kamila SWO, RN  Kayce NORTON, HERLINDA REIS, HERLINDA MELENDEZ, Clinic Facilitator  Ashlee CHOI, Clinic Facilitator [phone] (option 1)   For scheduling appts:     [phone] (select option 1)       For the Device Clinic (Pacemakers and ICD's)  RN's :  Tamara Garcia   During business hours: [phone]    *After business hours:  [phone] (select option 4)      Normal test result notifications will be released via [employer] or mailed within 7 business days.  All other test results,  will be communicated via telephone once reviewed by your cardiologist.    If you need a medication refill please contact your pharmacy.  Please allow 3 business days for your refill to be completed.    As always, thank you for trusting us with your health care needs!

## 2025-07-28 NOTE — LETTER
7/28/2025      RE: Gudelia Albarran  [address]  [place] [address]       Dear Colleague,    Thank you for the opportunity to participate in the care of your patient, Gudelia Albarran, at the [facility]. Please see a copy of my visit note below.                                                                                                   General Cardiology [facility]           Referring provider:Sergey Lowe MD     HPI: Ms. Gudelia Albarran is a 40 year old  female with PMH significant for    Hypertension  Obesity  SVT    Patient was seen in cardiology clinic 12/16/2020 for for 1 month history of palpitations during exercise.  Subsequent Holter monitor with Zio patch showed several episodes of narrow QRS tachycardia at rates of 280 bpm.  Longest lasting 10 minutes.  Some episodes of SVT may be possible atrial tachycardia.  SVT was detected within 45 seconds of symptomatic patient events.  She was referred to cardiology.    Today patient reports  - Onset of palpitations and episodes of rapid heart rate less than 1 year ago, never experienced prior  - Episodes triggered exclusively by physical exertion, especially during cardio workouts, heat, dehydration, or insufficient food intake  - Frequency of episodes approximately once every 6-8 workouts; typically exercises 2-3 times per week  - Symptoms never occur at rest; only with exertion or at high altitude with exertion (e.g., walking at elevation in [place]), but never full episode at rest  - Episodes characterized by sudden onset of heart racing, sometimes preceded by warning sensation; if stopped early, symptoms resolve quickly, usually in less than 1 minute  - If not stopped early, episodes last up to 2-3 minutes, associated with dizziness and need to sit down  - Occasional mild discomfort (not pain) during some episodes, no longer present since starting  metoprolol  - No dizziness since starting metoprolol  - Able to trigger episodes by exercising while dehydrated  - No history of similar symptoms in previous years  - Initial management with metoprolol started approximately 5-6 months ago, initially as needed, then changed to daily after echocardiogram  - Noted increased fatigue and reduced exercise tolerance after starting metoprolol, improved with time  - Symptoms less frequent and less severe on metoprolol, but still present  - No history of stress test prior to this encounter  - Home blood pressure monitoring performed in the past, but not recently; blood pressure consistently high in clinic  - No current use of wearable heart rate monitor (Fitbit previously used, now broken)  - No alcohol use except occasional glass of wine a couple times per month; actively reducing caffeine intake (drinks tea, no coffee)    Echocardiogram earlier this year is unremarkable.     Medications, personal, family, and social history reviewed with patient and revised.    PAST MEDICAL HISTORY:  Past Medical History:   Diagnosis Date     Hypertension 10/30/20    Reason for visit       CURRENT MEDICATIONS:  Current Outpatient Medications   Medication Sig Dispense Refill     etonogestrel (NEXPLANON) 68 MG IMPL        metoprolol succinate ER (TOPROL XL) 50 MG 24 hr tablet Take 1 tablet (50 mg) by mouth daily. 90 tablet 1       PAST SURGICAL HISTORY:  Past Surgical History:   Procedure Laterality Date     ENT SURGERY  1995    Tonsilectomy       ALLERGIES:     Allergies   Allergen Reactions     Propofol Nausea and Vomiting       FAMILY HISTORY:  Family History   Problem Relation Age of Onset     Genetic Disorder Mother         Colitis     Hypertension Father      Atrial fibrillation Father      Breast Cancer Maternal Grandmother      Hypertension Paternal Grandfather      Cerebrovascular Disease Paternal Grandfather         late 80's     Asthma Sister      Seizure Disorder Sister          trauma induced     Asthma Sister      Breast Cancer Maternal Aunt         late 50's     Diabetes No family hx of      Coronary Artery Disease No family hx of      Hyperlipidemia No family hx of      Colon Cancer No family hx of      Depression No family hx of      Anxiety Disorder No family hx of      Thyroid Disease No family hx of      Genetic Disorder No family hx of          SOCIAL HISTORY:  Social History     Tobacco Use     Smoking status: Never     Smokeless tobacco: Never   Vaping Use     Vaping status: Never Used   Substance Use Topics     Alcohol use: Yes     Comment: An occasional glass of wine with dinner     Drug use: No       ROS:   Constitutional: No fever, chills, or sweats. Weight stable.   Cardiovascular: As per HPI.       Exam:  There were no vitals taken for this visit.  GENERAL APPEARANCE: alert and no distress  HEENT: no icterus, no central cyanosis  LYMPH/NECK: no adenopathy, no asymmetry, JVP not elevated, no carotid bruits.  RESPIRATORY: lungs clear to auscultation - no rales, rhonchi or wheezes, no use of accessory muscles, no retractions, respirations are unlabored, normal respiratory rate  CARDIOVASCULAR: regular rhythm, normal S1, S2, no S3 or S4 and no murmur, click or rub, precordium quiet with normal PMI.  GI: soft, non tender  EXTREMITIES: peripheral pulses normal, no edema  NEURO: alert, normal speech,and affect  SKIN: no ecchymoses, no rashes     I have reviewed the labs and personally reviewed the imaging below and made my comment in the assessment and plan.    Labs:  CBC RESULTS:   Lab Results   Component Value Date    WBC 5.7 11/18/2024    RBC 4.96 11/18/2024    HGB 13.8 11/18/2024    HCT 40.8 11/18/2024    MCV 82 11/18/2024    MCH 27.8 11/18/2024    MCHC 33.8 11/18/2024    RDW 12.5 11/18/2024     11/18/2024       BMP RESULTS:  Lab Results   Component Value Date     11/18/2024     12/31/2020    POTASSIUM 4.0 11/18/2024    POTASSIUM 3.8 12/31/2020    CHLORIDE  106 11/18/2024    CHLORIDE 106 12/31/2020    CO2 23 11/18/2024    CO2 26 12/31/2020    ANIONGAP 14 11/18/2024    ANIONGAP 5 12/31/2020     (H) 11/18/2024     (H) 12/31/2020    BUN 11.6 11/18/2024    BUN 14 12/31/2020    CR 0.74 11/18/2024    CR 0.67 12/31/2020    GFRESTIMATED >90 11/18/2024    GFRESTIMATED >90 12/31/2020    GFRESTBLACK >90 12/31/2020    DANICA 10.1 11/18/2024    DANICA 9.5 12/31/2020      Echocardiogram 1/10/2025  Global and regional left ventricular function is normal with an EF of 60-65%.  Global right ventricular function is normal.  Pulmonary artery systolic pressure is normal.  No significant valvular abnormalities present.  Estimated mean right atrial pressure is normal.  No pericardial effusion is present.    Zio patch extended Holter  During extended Holter monitoring using a Zio patch from 11/18/2024 through 11/23/2024, her underlying rhythm was sinus with heart rate varying between 46 to 180 bpm with average heart rate of 82 bpm.  There were rare PAC with a burden of less than 1%.  There were rare PVC with a burden of less than 1%.  There was no ventricular tachycardia.  There were 72 episodes of narrow QRS complex tachycardia with the fastest episode at a rate of 279 bpm lasting for 47.8 seconds.  The longest episode lasted for 10 minutes and 3 seconds with an average rate of 236 bpm.  The 3 triggered events during the period of monitoring correlated with regular narrow QRS complex tachycardia with a cycle length of 230 ms, sinus tachycardia with a cycle length of 360 ms interposed with PAC, and narrow QRS complex tachycardia with a cycle length of 230 ms respectively.  There was no patient diary report of symptoms.             Assessment and Plan:   1. Supraventricular tachycardia (SVT), exercise-induced only     - SVT is present, characterized by episodes of abnormal heart rhythms originating from the top chambers of the heart. These episodes are not life-threatening but can  cause symptoms such as dizziness and lightheadedness.     - Echocardiogram and baseline EKG EKG results are normal, with no concerning findings.     - SVT episodes are triggered by physical exertion, dehydration, and inadequate nutrition, with heart rates reaching up to 280 beats per minute and lasting up to 10 minutes.     - Current management with metoprolol has reduced the frequency and severity of symptoms, but episodes persist.     - Consideration of ablation as a first-line treatment for SVT, with potential for cure and elimination of the need for lifelong medication.    2.  Diastolic hypertension     - Blood pressure has been consistently high in clinic and at home in the past.     - Diltiazem may provide additional benefit for blood pressure control.    Plan:  - Initiate treatment with diltiazem 120 mg daily as an alternative to metoprolol.  Stop metoprolol.  This medication may help manage SVT without causing fatigue. Start with a low dose and adjust as needed based on response and side effects.  - Schedule an SVT ablation procedure to potentially cure the arrhythmia. This procedure involves mapping the heart to locate the source of the SVT and using radiofrequency energy to eliminate it. The patient expressed interest in scheduling this for later in the fall after travel plans.  - Prior to the ablation, discontinue all medications and refrain from exercise for one week to allow for a washout period. This is necessary to ensure accurate mapping during the procedure.    Return to clinic 6 months    Steven MAYNARD documentation tool used in the creation of this note.     Total time spent today for this visit is 54 minutes including precharting, face-to-face clinic visit, review of labs/imaging and medical documentation.    Donta WOODRUFF MD  [facility] Division of Cardiology    Securely message with CaptureProof     Please do not hesitate to contact me if you have any questions/concerns.     Sincerely,      Donta Castellanos MD

## 2025-07-28 NOTE — NURSING NOTE
"Chief Complaint   Patient presents with    Follow Up     Echo    RECHECK       Initial BP (!) 132/91 (BP Location: Right arm, Patient Position: Chair, Cuff Size: Adult Regular)   Pulse 66   Wt 74.4 kg (164 lb)   SpO2 96%   BMI 30.99 kg/m   Estimated body mass index is 30.99 kg/m  as calculated from the following:    Height as of 1/13/25: 1.549 m (5' 1\").    Weight as of this encounter: 74.4 kg (164 lb)..  BP completed using cuff size: regular    Ashlee Charles, Visit Facilitator    "

## 2025-07-29 RX ORDER — LIDOCAINE 40 MG/G
CREAM TOPICAL
OUTPATIENT
Start: 2025-07-29

## 2025-07-29 NOTE — NURSING NOTE
Med Reconcile: Reviewed and verified all current medications with the patient. The updated medication list was printed and given to the patient.    New Medication: Patient was educated regarding newly prescribed medication, including discussion of  the indication, administration, side effects, and when to report to MD or RN. Patient demonstrated understanding of this information and agreed to call with further questions or concerns. Patient to start diltiazem.     EP Procedure: Patient given instructions regarding  ablation Discussed purpose, preparation, and procedure with the patient. Patient demonstrated understanding of this information and agreed to call with further questions or concerns. Patient to have SVT ablation.    Medication Change: Patient was educated regarding prescribed medication change, including discussion of the indication, administration, side effects, and when to report to MD or RN. Patient demonstrated understanding of this information and agreed to call with further questions or concerns. Patient to stop metoprolol.    Patient stated she understood all health information given and agreed to call with further questions or concerns.     Scarlett Bennett RN, BSN  Cardiology RN Care Coordinator   [place]   Phone: [phone]  Fax: [phone] ([place]) [phone] ([place])

## 2025-08-09 ENCOUNTER — MYC MEDICAL ADVICE (OUTPATIENT)
Dept: CARDIOLOGY | Facility: CLINIC | Age: 40
End: 2025-08-09
Payer: COMMERCIAL

## 2025-08-11 ENCOUNTER — TELEPHONE (OUTPATIENT)
Dept: CARDIOLOGY | Facility: CLINIC | Age: 40
End: 2025-08-11
Payer: COMMERCIAL

## 2025-08-19 ENCOUNTER — TELEPHONE (OUTPATIENT)
Dept: CARDIOLOGY | Facility: CLINIC | Age: 40
End: 2025-08-19
Payer: COMMERCIAL